# Patient Record
Sex: FEMALE | Race: WHITE | NOT HISPANIC OR LATINO
[De-identification: names, ages, dates, MRNs, and addresses within clinical notes are randomized per-mention and may not be internally consistent; named-entity substitution may affect disease eponyms.]

---

## 2017-02-21 ENCOUNTER — APPOINTMENT (OUTPATIENT)
Dept: PULMONOLOGY | Facility: CLINIC | Age: 82
End: 2017-02-21

## 2017-02-21 ENCOUNTER — APPOINTMENT (OUTPATIENT)
Dept: OPHTHALMOLOGY | Facility: CLINIC | Age: 82
End: 2017-02-21

## 2017-02-21 VITALS
SYSTOLIC BLOOD PRESSURE: 128 MMHG | BODY MASS INDEX: 27.48 KG/M2 | OXYGEN SATURATION: 98 % | HEIGHT: 60 IN | DIASTOLIC BLOOD PRESSURE: 82 MMHG | WEIGHT: 140 LBS | HEART RATE: 77 BPM

## 2017-03-26 ENCOUNTER — FORM ENCOUNTER (OUTPATIENT)
Age: 82
End: 2017-03-26

## 2017-03-27 ENCOUNTER — OUTPATIENT (OUTPATIENT)
Dept: OUTPATIENT SERVICES | Facility: HOSPITAL | Age: 82
LOS: 1 days | End: 2017-03-27
Payer: MEDICARE

## 2017-03-27 ENCOUNTER — APPOINTMENT (OUTPATIENT)
Dept: CT IMAGING | Facility: IMAGING CENTER | Age: 82
End: 2017-03-27

## 2017-03-27 DIAGNOSIS — Z00.8 ENCOUNTER FOR OTHER GENERAL EXAMINATION: ICD-10-CM

## 2017-03-27 DIAGNOSIS — Z90.49 ACQUIRED ABSENCE OF OTHER SPECIFIED PARTS OF DIGESTIVE TRACT: Chronic | ICD-10-CM

## 2017-03-27 PROCEDURE — 71250 CT THORAX DX C-: CPT

## 2017-03-29 ENCOUNTER — APPOINTMENT (OUTPATIENT)
Dept: THORACIC SURGERY | Facility: CLINIC | Age: 82
End: 2017-03-29

## 2017-03-29 VITALS
HEIGHT: 60 IN | SYSTOLIC BLOOD PRESSURE: 134 MMHG | DIASTOLIC BLOOD PRESSURE: 78 MMHG | BODY MASS INDEX: 27.48 KG/M2 | HEART RATE: 76 BPM | WEIGHT: 140 LBS | OXYGEN SATURATION: 97 %

## 2017-03-29 RX ORDER — MULTIVITAMIN
TABLET ORAL
Refills: 0 | Status: ACTIVE | COMMUNITY

## 2017-05-23 ENCOUNTER — APPOINTMENT (OUTPATIENT)
Dept: PULMONOLOGY | Facility: CLINIC | Age: 82
End: 2017-05-23

## 2017-05-23 VITALS
HEIGHT: 60 IN | WEIGHT: 138 LBS | OXYGEN SATURATION: 98 % | SYSTOLIC BLOOD PRESSURE: 166 MMHG | DIASTOLIC BLOOD PRESSURE: 82 MMHG | BODY MASS INDEX: 27.09 KG/M2 | HEART RATE: 75 BPM

## 2017-05-23 RX ORDER — ENALAPRIL MALEATE 5 MG/1
5 TABLET ORAL
Qty: 30 | Refills: 0 | Status: DISCONTINUED | COMMUNITY
Start: 2017-04-03

## 2017-06-06 ENCOUNTER — EMERGENCY (EMERGENCY)
Facility: HOSPITAL | Age: 82
LOS: 1 days | Discharge: HOME | End: 2017-06-06
Attending: EMERGENCY MEDICINE | Admitting: EMERGENCY MEDICINE
Payer: MEDICARE

## 2017-06-06 VITALS
DIASTOLIC BLOOD PRESSURE: 80 MMHG | RESPIRATION RATE: 18 BRPM | HEART RATE: 70 BPM | OXYGEN SATURATION: 97 % | TEMPERATURE: 98 F | SYSTOLIC BLOOD PRESSURE: 177 MMHG

## 2017-06-06 VITALS — SYSTOLIC BLOOD PRESSURE: 166 MMHG | HEART RATE: 70 BPM | DIASTOLIC BLOOD PRESSURE: 80 MMHG

## 2017-06-06 DIAGNOSIS — Z79.899 OTHER LONG TERM (CURRENT) DRUG THERAPY: ICD-10-CM

## 2017-06-06 DIAGNOSIS — I10 ESSENTIAL (PRIMARY) HYPERTENSION: ICD-10-CM

## 2017-06-06 DIAGNOSIS — E03.9 HYPOTHYROIDISM, UNSPECIFIED: ICD-10-CM

## 2017-06-06 DIAGNOSIS — Z87.891 PERSONAL HISTORY OF NICOTINE DEPENDENCE: ICD-10-CM

## 2017-06-06 DIAGNOSIS — Z98.890 OTHER SPECIFIED POSTPROCEDURAL STATES: ICD-10-CM

## 2017-06-06 DIAGNOSIS — E78.00 PURE HYPERCHOLESTEROLEMIA, UNSPECIFIED: ICD-10-CM

## 2017-06-06 DIAGNOSIS — Z90.49 ACQUIRED ABSENCE OF OTHER SPECIFIED PARTS OF DIGESTIVE TRACT: ICD-10-CM

## 2017-06-06 DIAGNOSIS — Z90.49 ACQUIRED ABSENCE OF OTHER SPECIFIED PARTS OF DIGESTIVE TRACT: Chronic | ICD-10-CM

## 2017-06-06 DIAGNOSIS — Z98.49 CATARACT EXTRACTION STATUS, UNSPECIFIED EYE: ICD-10-CM

## 2017-06-06 LAB
ALBUMIN SERPL ELPH-MCNC: 4.2 G/DL — SIGNIFICANT CHANGE UP (ref 3.3–5)
ALP SERPL-CCNC: 87 U/L — SIGNIFICANT CHANGE UP (ref 40–120)
ALT FLD-CCNC: 14 U/L RC — SIGNIFICANT CHANGE UP (ref 10–45)
ANION GAP SERPL CALC-SCNC: 13 MMOL/L — SIGNIFICANT CHANGE UP (ref 5–17)
AST SERPL-CCNC: 22 U/L — SIGNIFICANT CHANGE UP (ref 10–40)
BILIRUB SERPL-MCNC: 0.4 MG/DL — SIGNIFICANT CHANGE UP (ref 0.2–1.2)
BUN SERPL-MCNC: 15 MG/DL — SIGNIFICANT CHANGE UP (ref 7–23)
CALCIUM SERPL-MCNC: 9.6 MG/DL — SIGNIFICANT CHANGE UP (ref 8.4–10.5)
CHLORIDE SERPL-SCNC: 90 MMOL/L — LOW (ref 96–108)
CO2 SERPL-SCNC: 26 MMOL/L — SIGNIFICANT CHANGE UP (ref 22–31)
CREAT SERPL-MCNC: 0.79 MG/DL — SIGNIFICANT CHANGE UP (ref 0.5–1.3)
GLUCOSE SERPL-MCNC: 124 MG/DL — HIGH (ref 70–99)
POTASSIUM SERPL-MCNC: 4.3 MMOL/L — SIGNIFICANT CHANGE UP (ref 3.5–5.3)
POTASSIUM SERPL-SCNC: 4.3 MMOL/L — SIGNIFICANT CHANGE UP (ref 3.5–5.3)
PROT SERPL-MCNC: 7 G/DL — SIGNIFICANT CHANGE UP (ref 6–8.3)
SODIUM SERPL-SCNC: 129 MMOL/L — LOW (ref 135–145)

## 2017-06-06 PROCEDURE — 99284 EMERGENCY DEPT VISIT MOD MDM: CPT | Mod: GC

## 2017-06-06 PROCEDURE — 99283 EMERGENCY DEPT VISIT LOW MDM: CPT

## 2017-06-06 PROCEDURE — 80053 COMPREHEN METABOLIC PANEL: CPT

## 2017-06-06 RX ORDER — LOSARTAN POTASSIUM 100 MG/1
25 TABLET, FILM COATED ORAL DAILY
Qty: 0 | Refills: 0 | Status: DISCONTINUED | OUTPATIENT
Start: 2017-06-06 | End: 2017-06-10

## 2017-06-06 RX ORDER — LOSARTAN POTASSIUM 100 MG/1
1 TABLET, FILM COATED ORAL
Qty: 30 | Refills: 0
Start: 2017-06-06 | End: 2017-07-06

## 2017-06-06 RX ADMIN — LOSARTAN POTASSIUM 25 MILLIGRAM(S): 100 TABLET, FILM COATED ORAL at 15:32

## 2017-06-06 NOTE — ED ADULT NURSE NOTE - OBJECTIVE STATEMENT
c/o elevated blood pressure readings at home for about one week. Denies any headache, visual changes, chest pain, sob, dizziness, palpitations, nausea, vomiting, diarrhea or fevers. Reports feeling anxious and feeling occasional tingling in hands and feet when she is anxious.

## 2017-06-06 NOTE — ED ADULT NURSE NOTE - PMH
Adult Hypothyroidism    Cholelithiasis    HTN (Hypertension)    Hypercholesteremia    IBS (irritable bowel syndrome)

## 2017-06-06 NOTE — ED PROVIDER NOTE - ATTENDING CONTRIBUTION TO CARE
I, Dr. Keron Garcia, interviewed the patient and performed a physical exam and spoke to the resident about the plan of care for this patient.  Pt with asxs HTN.  no cp, sob.  Appears well.

## 2017-06-06 NOTE — ED PROVIDER NOTE - NS ED ROS FT
No fever/chills, no change in vision, no throat pain, no chest pain, no abdominal pain, no nausea/vomiting,  no dysuria, no joint pain, no rashes, no focal numbness or weakness, no known mental health issues

## 2017-06-06 NOTE — ED PROVIDER NOTE - CARE PLAN
Principal Discharge DX:	HTN (hypertension)  Instructions for follow-up, activity and diet:	Take all your previous medications as prescribed. Take Losartan 25mg by mouth, once a day. Follow up with Dr. Fry as planned on Friday. Any new or worsening symptoms, chest pain, shortness of breath, change in vision, headache, numbness or weakness or any other concern please return immediately to the emergency room.

## 2017-06-06 NOTE — ED PROVIDER NOTE - PROGRESS NOTE DETAILS
Discussed Dr. Neri - cough with enalapril; dizziness with norvasc; losartan 25mg fu on Friday Discussed Dr. Neri - cough with enalapril; dizziness with norvasc; losartan 25mg fu on Friday. Pt already has appointment

## 2017-06-06 NOTE — ED PROVIDER NOTE - PLAN OF CARE
Take all your previous medications as prescribed. Take Losartan 25mg by mouth, once a day. Follow up with Dr. Fry as planned on Friday. Any new or worsening symptoms, chest pain, shortness of breath, change in vision, headache, numbness or weakness or any other concern please return immediately to the emergency room.

## 2017-06-06 NOTE — ED PROVIDER NOTE - MEDICAL DECISION MAKING DETAILS
Essential HTN, assymptomatic. Essential HTN, asymptomatic - 170's/100's. discussed with primary and pt to start losartan 25mg and fu with pmd on friday. Pt has appt currently.

## 2017-06-06 NOTE — ED PROVIDER NOTE - OBJECTIVE STATEMENT
89F pmhx of HTN, HLD, told to come to ED for BP management bc SYS at home was 190.     CARD: [place] [facility] Dr. Burciaga   PMD: Teto Fry. -89F pmhx of HTN, HLD, told to come to ED for BP management b/c SYS at home was 190.     CARD: [place] [facility] Dr. Burciaga   PMD: Teto Fry. 89F pmhx of HTN, HLD, told to come to ED for BP management b/c SYS at home was 190 x 1; pt has journal which demonstrates multiple blood pressures over the last three days with ranges from 140-170's. Pt is assymptomatic      CARD: [facility] Dr. Burciaga   PMD: Teto Fry. 89F pmhx of HTN, HLD, told to come to ED for BP management b/c SYS at home was 190 x 1; pt has journal which demonstrates multiple blood pressures over the last three days with ranges from 140-170's. Pt is asymptomatic, no ha, no cp, no sob, no decreased uop, no leg swelling.       CARD: [facility] Dr. Burciaga   PMD: Teto Fry.

## 2017-06-18 ENCOUNTER — EMERGENCY (EMERGENCY)
Facility: HOSPITAL | Age: 82
LOS: 1 days | Discharge: HOME | End: 2017-06-18
Attending: EMERGENCY MEDICINE | Admitting: EMERGENCY MEDICINE
Payer: MEDICARE

## 2017-06-18 VITALS
RESPIRATION RATE: 16 BRPM | SYSTOLIC BLOOD PRESSURE: 146 MMHG | DIASTOLIC BLOOD PRESSURE: 80 MMHG | HEART RATE: 69 BPM | OXYGEN SATURATION: 98 %

## 2017-06-18 VITALS
DIASTOLIC BLOOD PRESSURE: 96 MMHG | HEART RATE: 73 BPM | TEMPERATURE: 98 F | SYSTOLIC BLOOD PRESSURE: 192 MMHG | RESPIRATION RATE: 18 BRPM | OXYGEN SATURATION: 97 %

## 2017-06-18 DIAGNOSIS — Z90.49 ACQUIRED ABSENCE OF OTHER SPECIFIED PARTS OF DIGESTIVE TRACT: Chronic | ICD-10-CM

## 2017-06-18 LAB
ALBUMIN SERPL ELPH-MCNC: 4.1 G/DL — SIGNIFICANT CHANGE UP (ref 3.3–5)
ALP SERPL-CCNC: 92 U/L — SIGNIFICANT CHANGE UP (ref 40–120)
ALT FLD-CCNC: 16 U/L RC — SIGNIFICANT CHANGE UP (ref 10–45)
ANION GAP SERPL CALC-SCNC: 13 MMOL/L — SIGNIFICANT CHANGE UP (ref 5–17)
APPEARANCE UR: CLEAR — SIGNIFICANT CHANGE UP
AST SERPL-CCNC: 21 U/L — SIGNIFICANT CHANGE UP (ref 10–40)
BACTERIA # UR AUTO: ABNORMAL /HPF
BASOPHILS # BLD AUTO: 0 K/UL — SIGNIFICANT CHANGE UP (ref 0–0.2)
BASOPHILS NFR BLD AUTO: 0.5 % — SIGNIFICANT CHANGE UP (ref 0–2)
BILIRUB SERPL-MCNC: 0.3 MG/DL — SIGNIFICANT CHANGE UP (ref 0.2–1.2)
BILIRUB UR-MCNC: NEGATIVE — SIGNIFICANT CHANGE UP
BUN SERPL-MCNC: 26 MG/DL — HIGH (ref 7–23)
CALCIUM SERPL-MCNC: 9.2 MG/DL — SIGNIFICANT CHANGE UP (ref 8.4–10.5)
CHLORIDE SERPL-SCNC: 94 MMOL/L — LOW (ref 96–108)
CO2 SERPL-SCNC: 27 MMOL/L — SIGNIFICANT CHANGE UP (ref 22–31)
COLOR SPEC: COLORLESS — SIGNIFICANT CHANGE UP
CREAT SERPL-MCNC: 0.84 MG/DL — SIGNIFICANT CHANGE UP (ref 0.5–1.3)
DIFF PNL FLD: NEGATIVE — SIGNIFICANT CHANGE UP
EOSINOPHIL # BLD AUTO: 0.1 K/UL — SIGNIFICANT CHANGE UP (ref 0–0.5)
EOSINOPHIL NFR BLD AUTO: 2 % — SIGNIFICANT CHANGE UP (ref 0–6)
EPI CELLS # UR: SIGNIFICANT CHANGE UP /HPF
GLUCOSE SERPL-MCNC: 132 MG/DL — HIGH (ref 70–99)
GLUCOSE UR QL: NEGATIVE — SIGNIFICANT CHANGE UP
HCT VFR BLD CALC: 39.5 % — SIGNIFICANT CHANGE UP (ref 34.5–45)
HGB BLD-MCNC: 13.2 G/DL — SIGNIFICANT CHANGE UP (ref 11.5–15.5)
KETONES UR-MCNC: NEGATIVE — SIGNIFICANT CHANGE UP
LEUKOCYTE ESTERASE UR-ACNC: NEGATIVE — SIGNIFICANT CHANGE UP
LYMPHOCYTES # BLD AUTO: 1.8 K/UL — SIGNIFICANT CHANGE UP (ref 1–3.3)
LYMPHOCYTES # BLD AUTO: 25 % — SIGNIFICANT CHANGE UP (ref 13–44)
MCHC RBC-ENTMCNC: 32.5 PG — SIGNIFICANT CHANGE UP (ref 27–34)
MCHC RBC-ENTMCNC: 33.5 GM/DL — SIGNIFICANT CHANGE UP (ref 32–36)
MCV RBC AUTO: 96.9 FL — SIGNIFICANT CHANGE UP (ref 80–100)
MONOCYTES # BLD AUTO: 0.7 K/UL — SIGNIFICANT CHANGE UP (ref 0–0.9)
MONOCYTES NFR BLD AUTO: 10.3 % — SIGNIFICANT CHANGE UP (ref 2–14)
NEUTROPHILS # BLD AUTO: 4.4 K/UL — SIGNIFICANT CHANGE UP (ref 1.8–7.4)
NEUTROPHILS NFR BLD AUTO: 62.3 % — SIGNIFICANT CHANGE UP (ref 43–77)
NITRITE UR-MCNC: NEGATIVE — SIGNIFICANT CHANGE UP
PH UR: 7 — SIGNIFICANT CHANGE UP (ref 5–8)
PLATELET # BLD AUTO: 148 K/UL — LOW (ref 150–400)
POTASSIUM SERPL-MCNC: 4.6 MMOL/L — SIGNIFICANT CHANGE UP (ref 3.5–5.3)
POTASSIUM SERPL-SCNC: 4.6 MMOL/L — SIGNIFICANT CHANGE UP (ref 3.5–5.3)
PROT SERPL-MCNC: 7 G/DL — SIGNIFICANT CHANGE UP (ref 6–8.3)
PROT UR-MCNC: NEGATIVE — SIGNIFICANT CHANGE UP
RBC # BLD: 4.07 M/UL — SIGNIFICANT CHANGE UP (ref 3.8–5.2)
RBC # FLD: 11.8 % — SIGNIFICANT CHANGE UP (ref 10.3–14.5)
SODIUM SERPL-SCNC: 134 MMOL/L — LOW (ref 135–145)
SP GR SPEC: 1.01 — LOW (ref 1.01–1.02)
UROBILINOGEN FLD QL: NEGATIVE — SIGNIFICANT CHANGE UP
WBC # BLD: 7.1 K/UL — SIGNIFICANT CHANGE UP (ref 3.8–10.5)
WBC # FLD AUTO: 7.1 K/UL — SIGNIFICANT CHANGE UP (ref 3.8–10.5)
WBC UR QL: SIGNIFICANT CHANGE UP /HPF (ref 0–5)

## 2017-06-18 PROCEDURE — 70450 CT HEAD/BRAIN W/O DYE: CPT

## 2017-06-18 PROCEDURE — 80053 COMPREHEN METABOLIC PANEL: CPT

## 2017-06-18 PROCEDURE — 85027 COMPLETE CBC AUTOMATED: CPT

## 2017-06-18 PROCEDURE — 70450 CT HEAD/BRAIN W/O DYE: CPT | Mod: 26

## 2017-06-18 PROCEDURE — 93010 ELECTROCARDIOGRAM REPORT: CPT

## 2017-06-18 PROCEDURE — 93005 ELECTROCARDIOGRAM TRACING: CPT

## 2017-06-18 PROCEDURE — 81001 URINALYSIS AUTO W/SCOPE: CPT

## 2017-06-18 PROCEDURE — 99284 EMERGENCY DEPT VISIT MOD MDM: CPT | Mod: 25,GC

## 2017-06-18 PROCEDURE — 99284 EMERGENCY DEPT VISIT MOD MDM: CPT | Mod: 25

## 2017-06-18 NOTE — ED ADULT NURSE NOTE - OBJECTIVE STATEMENT
Pt is an 89YOF received ambulatory A&Ox4 complaining of high blood pressure. Pt states that she woke up from sleep feeling "weird" Pt states that she then took her BP and noticed that it was elevated, 196/80. Pt states that she took all of her medications as prescribed today, no missed doses. Pt denies any headache, dizziness, chest pain, SOB, nausea, vomiting, fever, chills, or abdominal pain. Pt is unable to describe her "weird" feeling, no visual changes. Pts BP upon arrival to room, 156/87.

## 2017-06-18 NOTE — ED PROVIDER NOTE - OBJECTIVE STATEMENT
Woke up from sleep feeling "off", took /80. No missed doses of medications. Currently BP improved. Reports no CP, SOB, palpitations. No H/A, vomiting, vision changes, issues w/ urination. Currently feels improved.     CARD: [facility] Dr. Burciaga   PMD: Teto Fry.

## 2017-06-18 NOTE — ED PROVIDER NOTE - PHYSICAL EXAMINATION
Physical Exam: elderly F who is in NAD, AAOx3, NCAT, MMM, neck is supple, PERRL, CTAB, normal rate and regular rhythm, abdomen is soft and NTND, No edema, No deformity of extremities, No rashes, CN grossly intact, No focal motor or sensory deficits. ~ Olman Farmer MD

## 2017-06-18 NOTE — ED PROVIDER NOTE - NS ED ROS FT
No fever, no chills, no change in vision, no change in hearing, no chest pain, no shortness of breath, no abdominal pain, no vomiting, no dysuria, no muscle pain, no rashes, no loss of consciousness. ~ Olman Farmer MD

## 2017-06-18 NOTE — ED PROVIDER NOTE - ATTENDING CONTRIBUTION TO CARE
patient presenting with asymptomatic htn at home. normal tensive in ED. no complaints. plan for education and discharge.

## 2017-09-11 ENCOUNTER — APPOINTMENT (OUTPATIENT)
Dept: OPHTHALMOLOGY | Facility: CLINIC | Age: 82
End: 2017-09-11
Payer: MEDICARE

## 2017-09-11 PROCEDURE — 92014 COMPRE OPH EXAM EST PT 1/>: CPT

## 2017-09-12 ENCOUNTER — OUTPATIENT (OUTPATIENT)
Dept: OUTPATIENT SERVICES | Facility: HOSPITAL | Age: 82
LOS: 1 days | End: 2017-09-12
Payer: MEDICARE

## 2017-09-12 ENCOUNTER — APPOINTMENT (OUTPATIENT)
Dept: MAMMOGRAPHY | Facility: IMAGING CENTER | Age: 82
End: 2017-09-12
Payer: MEDICARE

## 2017-09-12 DIAGNOSIS — Z00.00 ENCOUNTER FOR GENERAL ADULT MEDICAL EXAMINATION WITHOUT ABNORMAL FINDINGS: ICD-10-CM

## 2017-09-12 DIAGNOSIS — Z90.49 ACQUIRED ABSENCE OF OTHER SPECIFIED PARTS OF DIGESTIVE TRACT: Chronic | ICD-10-CM

## 2017-09-12 PROCEDURE — 77063 BREAST TOMOSYNTHESIS BI: CPT | Mod: 26

## 2017-09-12 PROCEDURE — 77067 SCR MAMMO BI INCL CAD: CPT

## 2017-09-12 PROCEDURE — G0202: CPT | Mod: 26

## 2017-09-12 PROCEDURE — 77063 BREAST TOMOSYNTHESIS BI: CPT

## 2018-03-05 ENCOUNTER — APPOINTMENT (OUTPATIENT)
Dept: OPHTHALMOLOGY | Facility: CLINIC | Age: 83
End: 2018-03-05
Payer: MEDICARE

## 2018-03-05 PROCEDURE — 92014 COMPRE OPH EXAM EST PT 1/>: CPT

## 2018-03-30 ENCOUNTER — APPOINTMENT (OUTPATIENT)
Dept: PULMONOLOGY | Facility: CLINIC | Age: 83
End: 2018-03-30
Payer: MEDICARE

## 2018-03-30 VITALS
TEMPERATURE: 98.1 F | OXYGEN SATURATION: 97 % | SYSTOLIC BLOOD PRESSURE: 150 MMHG | BODY MASS INDEX: 25.19 KG/M2 | WEIGHT: 129 LBS | DIASTOLIC BLOOD PRESSURE: 78 MMHG | HEART RATE: 73 BPM

## 2018-03-30 PROCEDURE — 99214 OFFICE O/P EST MOD 30 MIN: CPT

## 2018-09-10 ENCOUNTER — APPOINTMENT (OUTPATIENT)
Dept: OPHTHALMOLOGY | Facility: CLINIC | Age: 83
End: 2018-09-10

## 2018-09-12 ENCOUNTER — APPOINTMENT (OUTPATIENT)
Dept: OPHTHALMOLOGY | Facility: CLINIC | Age: 83
End: 2018-09-12
Payer: MEDICARE

## 2018-09-12 PROCEDURE — 92014 COMPRE OPH EXAM EST PT 1/>: CPT

## 2018-09-14 ENCOUNTER — OUTPATIENT (OUTPATIENT)
Dept: OUTPATIENT SERVICES | Facility: HOSPITAL | Age: 83
LOS: 1 days | End: 2018-09-14
Payer: MEDICARE

## 2018-09-14 ENCOUNTER — APPOINTMENT (OUTPATIENT)
Dept: MAMMOGRAPHY | Facility: IMAGING CENTER | Age: 83
End: 2018-09-14
Payer: MEDICARE

## 2018-09-14 DIAGNOSIS — Z00.8 ENCOUNTER FOR OTHER GENERAL EXAMINATION: ICD-10-CM

## 2018-09-14 DIAGNOSIS — Z90.49 ACQUIRED ABSENCE OF OTHER SPECIFIED PARTS OF DIGESTIVE TRACT: Chronic | ICD-10-CM

## 2018-09-14 PROCEDURE — 77067 SCR MAMMO BI INCL CAD: CPT | Mod: 26

## 2018-09-14 PROCEDURE — 77063 BREAST TOMOSYNTHESIS BI: CPT

## 2018-09-14 PROCEDURE — 77063 BREAST TOMOSYNTHESIS BI: CPT | Mod: 26

## 2018-09-14 PROCEDURE — 77067 SCR MAMMO BI INCL CAD: CPT

## 2018-09-19 ENCOUNTER — FORM ENCOUNTER (OUTPATIENT)
Age: 83
End: 2018-09-19

## 2018-09-20 ENCOUNTER — OUTPATIENT (OUTPATIENT)
Dept: OUTPATIENT SERVICES | Facility: HOSPITAL | Age: 83
LOS: 1 days | End: 2018-09-20
Payer: MEDICARE

## 2018-09-20 ENCOUNTER — APPOINTMENT (OUTPATIENT)
Dept: CT IMAGING | Facility: IMAGING CENTER | Age: 83
End: 2018-09-20
Payer: MEDICARE

## 2018-09-20 DIAGNOSIS — R91.8 OTHER NONSPECIFIC ABNORMAL FINDING OF LUNG FIELD: ICD-10-CM

## 2018-09-20 DIAGNOSIS — Z90.49 ACQUIRED ABSENCE OF OTHER SPECIFIED PARTS OF DIGESTIVE TRACT: Chronic | ICD-10-CM

## 2018-09-20 DIAGNOSIS — R05 COUGH: ICD-10-CM

## 2018-09-20 PROCEDURE — 71250 CT THORAX DX C-: CPT

## 2018-09-20 PROCEDURE — 71250 CT THORAX DX C-: CPT | Mod: 26

## 2018-09-26 ENCOUNTER — APPOINTMENT (OUTPATIENT)
Dept: THORACIC SURGERY | Facility: CLINIC | Age: 83
End: 2018-09-26
Payer: MEDICARE

## 2018-09-26 VITALS
DIASTOLIC BLOOD PRESSURE: 98 MMHG | BODY MASS INDEX: 25.32 KG/M2 | WEIGHT: 129 LBS | RESPIRATION RATE: 16 BRPM | TEMPERATURE: 98.1 F | OXYGEN SATURATION: 94 % | SYSTOLIC BLOOD PRESSURE: 182 MMHG | HEIGHT: 60 IN | HEART RATE: 76 BPM

## 2018-09-26 DIAGNOSIS — Z86.39 PERSONAL HISTORY OF OTHER ENDOCRINE, NUTRITIONAL AND METABOLIC DISEASE: ICD-10-CM

## 2018-09-26 PROCEDURE — 99213 OFFICE O/P EST LOW 20 MIN: CPT

## 2018-09-26 RX ORDER — ATORVASTATIN CALCIUM 20 MG/1
20 TABLET, FILM COATED ORAL
Qty: 90 | Refills: 0 | Status: DISCONTINUED | COMMUNITY
Start: 2016-05-04 | End: 2018-09-26

## 2018-09-26 RX ORDER — NAPROXEN SODIUM 220 MG
TABLET ORAL
Refills: 0 | Status: ACTIVE | COMMUNITY

## 2018-09-26 RX ORDER — METHYLPREDNISOLONE 4 MG/1
4 TABLET ORAL
Qty: 1 | Refills: 0 | Status: DISCONTINUED | COMMUNITY
Start: 2018-04-06 | End: 2018-09-26

## 2018-09-26 RX ORDER — AMLODIPINE BESYLATE 2.5 MG/1
2.5 TABLET ORAL
Qty: 30 | Refills: 0 | Status: DISCONTINUED | COMMUNITY
Start: 2017-05-22 | End: 2018-09-26

## 2018-09-26 RX ORDER — ALBUTEROL SULFATE 90 UG/1
AEROSOL, METERED RESPIRATORY (INHALATION)
Refills: 0 | Status: DISCONTINUED | COMMUNITY
End: 2018-09-26

## 2018-09-28 ENCOUNTER — APPOINTMENT (OUTPATIENT)
Dept: MAMMOGRAPHY | Facility: IMAGING CENTER | Age: 83
End: 2018-09-28
Payer: MEDICARE

## 2018-09-28 ENCOUNTER — OUTPATIENT (OUTPATIENT)
Dept: OUTPATIENT SERVICES | Facility: HOSPITAL | Age: 83
LOS: 1 days | End: 2018-09-28
Payer: MEDICARE

## 2018-09-28 ENCOUNTER — APPOINTMENT (OUTPATIENT)
Dept: ULTRASOUND IMAGING | Facility: IMAGING CENTER | Age: 83
End: 2018-09-28
Payer: MEDICARE

## 2018-09-28 DIAGNOSIS — Z90.49 ACQUIRED ABSENCE OF OTHER SPECIFIED PARTS OF DIGESTIVE TRACT: Chronic | ICD-10-CM

## 2018-09-28 DIAGNOSIS — Z00.8 ENCOUNTER FOR OTHER GENERAL EXAMINATION: ICD-10-CM

## 2018-09-28 PROCEDURE — 76642 ULTRASOUND BREAST LIMITED: CPT | Mod: 26,RT

## 2018-09-28 PROCEDURE — 77065 DX MAMMO INCL CAD UNI: CPT

## 2018-09-28 PROCEDURE — 77065 DX MAMMO INCL CAD UNI: CPT | Mod: 26,RT

## 2018-09-28 PROCEDURE — G0279: CPT

## 2018-09-28 PROCEDURE — G0279: CPT | Mod: 26

## 2018-09-28 PROCEDURE — 76642 ULTRASOUND BREAST LIMITED: CPT

## 2018-10-30 ENCOUNTER — APPOINTMENT (OUTPATIENT)
Dept: PULMONOLOGY | Facility: CLINIC | Age: 83
End: 2018-10-30
Payer: MEDICARE

## 2018-10-30 VITALS
WEIGHT: 131 LBS | TEMPERATURE: 98 F | BODY MASS INDEX: 25.58 KG/M2 | OXYGEN SATURATION: 100 % | HEART RATE: 82 BPM | SYSTOLIC BLOOD PRESSURE: 124 MMHG | DIASTOLIC BLOOD PRESSURE: 64 MMHG

## 2018-10-30 PROCEDURE — 99214 OFFICE O/P EST MOD 30 MIN: CPT

## 2018-10-30 RX ORDER — ALBUTEROL SULFATE 90 UG/1
108 (90 BASE) AEROSOL, METERED RESPIRATORY (INHALATION)
Qty: 1 | Refills: 1 | Status: ACTIVE | COMMUNITY
Start: 2018-10-30 | End: 1900-01-01

## 2019-04-01 ENCOUNTER — APPOINTMENT (OUTPATIENT)
Dept: OPHTHALMOLOGY | Facility: CLINIC | Age: 84
End: 2019-04-01
Payer: MEDICARE

## 2019-04-01 DIAGNOSIS — H04.123 DRY EYE SYNDROME OF BILATERAL LACRIMAL GLANDS: ICD-10-CM

## 2019-04-01 DIAGNOSIS — H52.31 ANISOMETROPIA: ICD-10-CM

## 2019-04-01 DIAGNOSIS — Z96.1 PRESENCE OF INTRAOCULAR LENS: ICD-10-CM

## 2019-04-01 PROCEDURE — 92014 COMPRE OPH EXAM EST PT 1/>: CPT

## 2019-10-02 ENCOUNTER — APPOINTMENT (OUTPATIENT)
Dept: OPHTHALMOLOGY | Facility: CLINIC | Age: 84
End: 2019-10-02

## 2019-10-15 ENCOUNTER — OUTPATIENT (OUTPATIENT)
Dept: OUTPATIENT SERVICES | Facility: HOSPITAL | Age: 84
LOS: 1 days | End: 2019-10-15
Payer: MEDICARE

## 2019-10-15 ENCOUNTER — APPOINTMENT (OUTPATIENT)
Dept: MAMMOGRAPHY | Facility: IMAGING CENTER | Age: 84
End: 2019-10-15
Payer: MEDICARE

## 2019-10-15 ENCOUNTER — APPOINTMENT (OUTPATIENT)
Dept: ULTRASOUND IMAGING | Facility: IMAGING CENTER | Age: 84
End: 2019-10-15
Payer: MEDICARE

## 2019-10-15 ENCOUNTER — FORM ENCOUNTER (OUTPATIENT)
Age: 84
End: 2019-10-15

## 2019-10-15 DIAGNOSIS — Z00.8 ENCOUNTER FOR OTHER GENERAL EXAMINATION: ICD-10-CM

## 2019-10-15 DIAGNOSIS — Z90.49 ACQUIRED ABSENCE OF OTHER SPECIFIED PARTS OF DIGESTIVE TRACT: Chronic | ICD-10-CM

## 2019-10-15 PROCEDURE — 76641 ULTRASOUND BREAST COMPLETE: CPT | Mod: 26,50

## 2019-10-15 PROCEDURE — 76641 ULTRASOUND BREAST COMPLETE: CPT

## 2019-10-15 PROCEDURE — 77067 SCR MAMMO BI INCL CAD: CPT | Mod: 26

## 2019-10-15 PROCEDURE — 77063 BREAST TOMOSYNTHESIS BI: CPT | Mod: 26

## 2019-10-15 PROCEDURE — 77067 SCR MAMMO BI INCL CAD: CPT

## 2019-10-15 PROCEDURE — 77063 BREAST TOMOSYNTHESIS BI: CPT

## 2019-10-16 ENCOUNTER — APPOINTMENT (OUTPATIENT)
Dept: CT IMAGING | Facility: IMAGING CENTER | Age: 84
End: 2019-10-16
Payer: MEDICARE

## 2019-10-16 ENCOUNTER — OUTPATIENT (OUTPATIENT)
Dept: OUTPATIENT SERVICES | Facility: HOSPITAL | Age: 84
LOS: 1 days | End: 2019-10-16
Payer: MEDICARE

## 2019-10-16 ENCOUNTER — APPOINTMENT (OUTPATIENT)
Dept: MAMMOGRAPHY | Facility: IMAGING CENTER | Age: 84
End: 2019-10-16
Payer: MEDICARE

## 2019-10-16 DIAGNOSIS — Z90.49 ACQUIRED ABSENCE OF OTHER SPECIFIED PARTS OF DIGESTIVE TRACT: Chronic | ICD-10-CM

## 2019-10-16 DIAGNOSIS — R91.8 OTHER NONSPECIFIC ABNORMAL FINDING OF LUNG FIELD: ICD-10-CM

## 2019-10-16 PROCEDURE — G0279: CPT | Mod: 26

## 2019-10-16 PROCEDURE — 77065 DX MAMMO INCL CAD UNI: CPT | Mod: 26,LT

## 2019-10-16 PROCEDURE — G0279: CPT

## 2019-10-16 PROCEDURE — 71250 CT THORAX DX C-: CPT | Mod: 26

## 2019-10-16 PROCEDURE — 71250 CT THORAX DX C-: CPT

## 2019-10-16 PROCEDURE — 77065 DX MAMMO INCL CAD UNI: CPT

## 2019-10-23 ENCOUNTER — RESULT REVIEW (OUTPATIENT)
Age: 84
End: 2019-10-23

## 2019-10-23 ENCOUNTER — OUTPATIENT (OUTPATIENT)
Dept: OUTPATIENT SERVICES | Facility: HOSPITAL | Age: 84
LOS: 1 days | End: 2019-10-23
Payer: MEDICARE

## 2019-10-23 ENCOUNTER — APPOINTMENT (OUTPATIENT)
Dept: MAMMOGRAPHY | Facility: IMAGING CENTER | Age: 84
End: 2019-10-23
Payer: MEDICARE

## 2019-10-23 DIAGNOSIS — Z00.8 ENCOUNTER FOR OTHER GENERAL EXAMINATION: ICD-10-CM

## 2019-10-23 DIAGNOSIS — Z90.49 ACQUIRED ABSENCE OF OTHER SPECIFIED PARTS OF DIGESTIVE TRACT: Chronic | ICD-10-CM

## 2019-10-23 PROCEDURE — 88305 TISSUE EXAM BY PATHOLOGIST: CPT | Mod: 26

## 2019-10-23 PROCEDURE — 19081 BX BREAST 1ST LESION STRTCTC: CPT

## 2019-10-23 PROCEDURE — A4648: CPT

## 2019-10-23 PROCEDURE — 19081 BX BREAST 1ST LESION STRTCTC: CPT | Mod: LT

## 2019-10-23 PROCEDURE — 77065 DX MAMMO INCL CAD UNI: CPT

## 2019-10-23 PROCEDURE — 88305 TISSUE EXAM BY PATHOLOGIST: CPT

## 2019-10-23 PROCEDURE — 77065 DX MAMMO INCL CAD UNI: CPT | Mod: 26,LT

## 2019-10-30 ENCOUNTER — APPOINTMENT (OUTPATIENT)
Dept: THORACIC SURGERY | Facility: CLINIC | Age: 84
End: 2019-10-30
Payer: MEDICARE

## 2019-10-30 VITALS
BODY MASS INDEX: 25.78 KG/M2 | TEMPERATURE: 97 F | OXYGEN SATURATION: 97 % | RESPIRATION RATE: 16 BRPM | WEIGHT: 132 LBS | HEART RATE: 72 BPM | DIASTOLIC BLOOD PRESSURE: 74 MMHG | SYSTOLIC BLOOD PRESSURE: 137 MMHG

## 2019-10-30 PROCEDURE — 99215 OFFICE O/P EST HI 40 MIN: CPT

## 2019-10-31 ENCOUNTER — APPOINTMENT (OUTPATIENT)
Dept: PULMONOLOGY | Facility: CLINIC | Age: 84
End: 2019-10-31
Payer: MEDICARE

## 2019-10-31 VITALS
HEIGHT: 60 IN | RESPIRATION RATE: 16 BRPM | BODY MASS INDEX: 25.91 KG/M2 | OXYGEN SATURATION: 96 % | WEIGHT: 132 LBS | HEART RATE: 80 BPM | DIASTOLIC BLOOD PRESSURE: 70 MMHG | SYSTOLIC BLOOD PRESSURE: 138 MMHG

## 2019-10-31 DIAGNOSIS — Z01.811 ENCOUNTER FOR PREPROCEDURAL RESPIRATORY EXAMINATION: ICD-10-CM

## 2019-10-31 PROCEDURE — 94727 GAS DIL/WSHOT DETER LNG VOL: CPT

## 2019-10-31 PROCEDURE — 94729 DIFFUSING CAPACITY: CPT

## 2019-10-31 PROCEDURE — 94060 EVALUATION OF WHEEZING: CPT

## 2019-10-31 PROCEDURE — 99214 OFFICE O/P EST MOD 30 MIN: CPT | Mod: 25

## 2019-10-31 NOTE — PHYSICAL EXAM
[General Appearance - Well Developed] : well developed [Normal Appearance] : normal appearance [Well Groomed] : well groomed [General Appearance - Well Nourished] : well nourished [No Deformities] : no deformities [General Appearance - In No Acute Distress] : no acute distress [Normal Conjunctiva] : the conjunctiva exhibited no abnormalities [Eyelids - No Xanthelasma] : the eyelids demonstrated no xanthelasmas [Normal Oropharynx] : normal oropharynx [Neck Appearance] : the appearance of the neck was normal [Neck Cervical Mass (___cm)] : no neck mass was observed [Jugular Venous Distention Increased] : there was no jugular-venous distention [Thyroid Diffuse Enlargement] : the thyroid was not enlarged [Thyroid Nodule] : there were no palpable thyroid nodules [Heart Rate And Rhythm] : heart rate and rhythm were normal [Heart Sounds] : normal S1 and S2 [Murmurs] : no murmurs present [Edema] : no peripheral edema present [Veins - Varicosity Changes] : no varicosital changes were noted in the lower extremities [Respiration, Rhythm And Depth] : normal respiratory rhythm and effort [Exaggerated Use Of Accessory Muscles For Inspiration] : no accessory muscle use [Auscultation Breath Sounds / Voice Sounds] : lungs were clear to auscultation bilaterally [Abdomen Soft] : soft [Abdomen Tenderness] : non-tender [Abdomen Mass (___ Cm)] : no abdominal mass palpated [Nail Clubbing] : no clubbing of the fingernails [Cyanosis, Localized] : no localized cyanosis [Petechial Hemorrhages (___cm)] : no petechial hemorrhages [Skin Color & Pigmentation] : normal skin color and pigmentation [] : no rash [No Venous Stasis] : no venous stasis [Skin Lesions] : no skin lesions [No Skin Ulcers] : no skin ulcer [No Xanthoma] : no  xanthoma was observed [No Focal Deficits] : no focal deficits [Oriented To Time, Place, And Person] : oriented to person, place, and time [Impaired Insight] : insight and judgment were intact [Affect] : the affect was normal

## 2019-10-31 NOTE — CONSULT LETTER
[Dear  ___] : Dear  [unfilled], [Courtesy Letter:] : I had the pleasure of seeing your patient, [unfilled], in my office today. [Please see my note below.] : Please see my note below. [Sincerely,] : Sincerely, [FreeTextEntry2] : Dr. Terry Cifuentes (Pul)\par Dr. Teto Fry (PCP)\par Dr. Palma (Cardiologist)  [FreeTextEntry3] : Clive Leger MD, FACS \par , Division of Thoracic Surgery \par [facility] \par Chief, Thoracic Surgery \par [facility] \par Department of Cardiovascular & Thoracic Surgery \par  \par [facility] \par \par

## 2019-10-31 NOTE — HISTORY OF PRESENT ILLNESS
[FreeTextEntry1] : 89 years old female, former smoker, with hx of HTN, stroke and chronic lung nodules, followed by both Pul Dr. Cifuentes and our office. Patient has been followed with serial scans for several ground glass opacities. CT Chest on 9/20/18 revealed a 2.3 cm right lower lobe nodule previously right upper lobe groundglass nodules measure up to 1 cm and are unchanged. With a right upper lobe ill-defined 8 mm nodule is unchanged from several prior studies. \par \par CT Chest on 10/16/19:\par - interval increase in size of a subsolid opacity in the superior segment of the RLL with groundglass portion 2.7 cm (previously 2.1 cm) and and new 0.2 cm solid portion. \par - stable groundglass opacities in RUL\par - stable postinflammatory scarring and mild bronchiectasis in RML\par \par Pt presents today for follow up. Pt admits to cough, denies SOB or CP. \par

## 2019-10-31 NOTE — ASSESSMENT
[FreeTextEntry1] : 89 years old female, former smoker, with hx of HTN, stroke and chronic lung nodules, followed by both Pul Dr. Cifuentes and our office. Patient has been followed with serial scans for several ground glass opacities. \par \par CT reviewed with pt and I also spoke with pt's son Dr. Lauren on the phone. RLL nodule has grown larger and with solid component, which is highly suspicious for lung cancer. I recommended Rt VATS, wedge rxn of RLL. Risks and benefits and alternatives explained to patient, all questions answered, patient agreed to proceed with surgery. Prior to surgery, pt will need medical and cardiac clearance, and new set of PFTs. \par \par \par Written by Estrella Boo NP, acting as a scribe for Dr. Claudine Bull. \par \par The documentation recorded by the scribe accurately reflects the service I personally performed and the decisions made by me. CLAUDINE BULL MD\par

## 2019-10-31 NOTE — REASON FOR VISIT
[Abnormal CT Scan] : abnormal CT Scan [Preoperative Evaluation] : an preoperative evaluation [FreeTextEntry2] : pulmonary nodule

## 2019-10-31 NOTE — DATA REVIEWED
[FreeTextEntry1] : CT Chest on 10/16/19:\par - interval increase in size of a subsolid opacity in the superior segment of the RLL with groundglass portion 2.7 cm (previously 2.1 cm) and and new 0.2 cm solid portion. \par - stable groundglass opacities in RUL\par - stable postinflammatory scarring and mild bronchiectasis in RML\par

## 2019-10-31 NOTE — REVIEW OF SYSTEMS
[Hypertension] : ~T hypertension [Negative] : Endocrine [Cough] : no cough [Sputum] : not coughing up ~M sputum [Hemoptysis] : no hemoptysis [Dyspnea] : no dyspnea [Wheezing] : no wheezing

## 2019-10-31 NOTE — DISCUSSION/SUMMARY
[FreeTextEntry1] : 89 yo female with increase in size of pulmonary nodule, scheduled for surgical resection. Presently no pulmonary contraindication for planned surgery and anesthesia. She is to use albuterol MDI PRN.

## 2019-11-03 NOTE — HISTORY OF PRESENT ILLNESS
[FreeTextEntry1] : 89 yo female with abnormal chest CT findings, presents for preop pulmonary evaluation. Follow chest CT revealed increase in nodule size. The patient denies cough, chest pain, weight loss, night sweats or hemoptysis.

## 2019-11-03 NOTE — HISTORY OF PRESENT ILLNESS
[FreeTextEntry1] : 89 yo female with abnormal chest CT findings, presents for preop pulmonary evaluation. Follow chest CT revealed increase in nodule size. The patient denies cough, chest pain, weight loss, night sweats or hemoptysis.

## 2019-11-04 ENCOUNTER — OUTPATIENT (OUTPATIENT)
Dept: OUTPATIENT SERVICES | Facility: HOSPITAL | Age: 84
LOS: 1 days | End: 2019-11-04

## 2019-11-04 VITALS
HEIGHT: 60 IN | HEART RATE: 88 BPM | RESPIRATION RATE: 14 BRPM | TEMPERATURE: 98 F | WEIGHT: 128.09 LBS | DIASTOLIC BLOOD PRESSURE: 68 MMHG | OXYGEN SATURATION: 98 % | SYSTOLIC BLOOD PRESSURE: 128 MMHG

## 2019-11-04 DIAGNOSIS — Z90.49 ACQUIRED ABSENCE OF OTHER SPECIFIED PARTS OF DIGESTIVE TRACT: Chronic | ICD-10-CM

## 2019-11-04 DIAGNOSIS — Z98.49 CATARACT EXTRACTION STATUS, UNSPECIFIED EYE: Chronic | ICD-10-CM

## 2019-11-04 DIAGNOSIS — R91.1 SOLITARY PULMONARY NODULE: ICD-10-CM

## 2019-11-04 DIAGNOSIS — Z96.652 PRESENCE OF LEFT ARTIFICIAL KNEE JOINT: Chronic | ICD-10-CM

## 2019-11-04 DIAGNOSIS — I10 ESSENTIAL (PRIMARY) HYPERTENSION: ICD-10-CM

## 2019-11-04 DIAGNOSIS — E03.9 HYPOTHYROIDISM, UNSPECIFIED: ICD-10-CM

## 2019-11-04 DIAGNOSIS — Z90.89 ACQUIRED ABSENCE OF OTHER ORGANS: Chronic | ICD-10-CM

## 2019-11-04 LAB
ANION GAP SERPL CALC-SCNC: 12 MMO/L — SIGNIFICANT CHANGE UP (ref 7–14)
BLD GP AB SCN SERPL QL: NEGATIVE — SIGNIFICANT CHANGE UP
BUN SERPL-MCNC: 13 MG/DL — SIGNIFICANT CHANGE UP (ref 7–23)
CALCIUM SERPL-MCNC: 9.6 MG/DL — SIGNIFICANT CHANGE UP (ref 8.4–10.5)
CHLORIDE SERPL-SCNC: 91 MMOL/L — LOW (ref 98–107)
CO2 SERPL-SCNC: 27 MMOL/L — SIGNIFICANT CHANGE UP (ref 22–31)
CREAT SERPL-MCNC: 0.68 MG/DL — SIGNIFICANT CHANGE UP (ref 0.5–1.3)
GLUCOSE SERPL-MCNC: 98 MG/DL — SIGNIFICANT CHANGE UP (ref 70–99)
HCT VFR BLD CALC: 33.5 % — LOW (ref 34.5–45)
HGB BLD-MCNC: 11.2 G/DL — LOW (ref 11.5–15.5)
MCHC RBC-ENTMCNC: 31.7 PG — SIGNIFICANT CHANGE UP (ref 27–34)
MCHC RBC-ENTMCNC: 33.4 % — SIGNIFICANT CHANGE UP (ref 32–36)
MCV RBC AUTO: 94.9 FL — SIGNIFICANT CHANGE UP (ref 80–100)
NRBC # FLD: 0 K/UL — SIGNIFICANT CHANGE UP (ref 0–0)
PLATELET # BLD AUTO: 197 K/UL — SIGNIFICANT CHANGE UP (ref 150–400)
PMV BLD: 11.6 FL — SIGNIFICANT CHANGE UP (ref 7–13)
POTASSIUM SERPL-MCNC: 4.6 MMOL/L — SIGNIFICANT CHANGE UP (ref 3.5–5.3)
POTASSIUM SERPL-SCNC: 4.6 MMOL/L — SIGNIFICANT CHANGE UP (ref 3.5–5.3)
RBC # BLD: 3.53 M/UL — LOW (ref 3.8–5.2)
RBC # FLD: 12.4 % — SIGNIFICANT CHANGE UP (ref 10.3–14.5)
RH IG SCN BLD-IMP: POSITIVE — SIGNIFICANT CHANGE UP
SODIUM SERPL-SCNC: 130 MMOL/L — LOW (ref 135–145)
WBC # BLD: 6.29 K/UL — SIGNIFICANT CHANGE UP (ref 3.8–10.5)
WBC # FLD AUTO: 6.29 K/UL — SIGNIFICANT CHANGE UP (ref 3.8–10.5)

## 2019-11-04 RX ORDER — DOCUSATE SODIUM 100 MG
0 CAPSULE ORAL
Qty: 0 | Refills: 0 | DISCHARGE

## 2019-11-04 RX ORDER — SODIUM CHLORIDE 9 MG/ML
1000 INJECTION, SOLUTION INTRAVENOUS
Refills: 0 | Status: DISCONTINUED | OUTPATIENT
Start: 2019-11-07 | End: 2019-11-08

## 2019-11-04 RX ORDER — SODIUM CHLORIDE 9 MG/ML
3 INJECTION INTRAMUSCULAR; INTRAVENOUS; SUBCUTANEOUS EVERY 8 HOURS
Refills: 0 | Status: DISCONTINUED | OUTPATIENT
Start: 2019-11-07 | End: 2019-11-09

## 2019-11-04 NOTE — H&P PST ADULT - NEGATIVE GENERAL GENITOURINARY SYMPTOMS
no dysuria/normal urinary frequency/no hematuria no flank pain R/no dysuria/normal urinary frequency/no flank pain L/no hematuria

## 2019-11-04 NOTE — H&P PST ADULT - NEGATIVE GASTROINTESTINAL SYMPTOMS
no change in bowel habits/no constipation/no abdominal pain/no vomiting/no diarrhea/no nausea no abdominal pain/no change in bowel habits/no vomiting/no nausea/no diarrhea

## 2019-11-04 NOTE — H&P PST ADULT - NSICDXPROBLEM_GEN_ALL_CORE_FT
PROBLEM DIAGNOSES  Problem: Solitary pulmonary nodule  Assessment and Plan: Scheduled for Right Video Assisted Thoracoscopy, Right Lower Lobe Wedge Resection on 11/7/2019.  Preop instructions given, pt verbalized understanding   GI prophylaxis and Chlorhexidine wash with instructions provided- pt verbalized understanding using teach back method   Pt states she received  medical and cardiac clearance- copy of both clearances requested in PST with ECHO and Stress Test reports     Problem: HTN (hypertension)  Assessment and Plan: Pt will take Telmisartan and Carvediolol AM of surgery with sips of water    Problem: Hypothyroidism  Assessment and Plan: Pt will take Levothyroxine AM of surgery with sips of water

## 2019-11-04 NOTE — H&P PST ADULT - HISTORY OF PRESENT ILLNESS
89 y/o female presents to [facility] for preoperative evaluation with dx of solitary pulmonary nodule. h/o chronic lung nodules, under surveillance by Pulmonary. A CT of the chest in October, revealed changes in size of RLL nodule. Scheduled for Right Video Assisted Thoracoscopy, Right Lower Lobe Wedge Resection on 11/7/2019. Pt denies SOB, cough or wheezing. 89 y/o female presents to [facility] for preoperative evaluation with dx of solitary pulmonary nodule. h/o chronic lung nodules, under  surveillance by Pulmonary. A CT of the chest in October, revealed changes in size of RLL nodule. Scheduled for Right Video Assisted Thoracoscopy, Right Lower Lobe Wedge Resection on 11/7/2019. Pt denies SOB, cough or wheezing.

## 2019-11-04 NOTE — H&P PST ADULT - NSICDXPASTMEDICALHX_GEN_ALL_CORE_FT
PAST MEDICAL HISTORY:  Adult Hypothyroidism     Anisometropia     Cholelithiasis     HTN (Hypertension)     Hypercholesteremia     IBS (irritable bowel syndrome)     Lung nodule "chronic lung nodules"    Mini stroke 4-5 years ago    OA (osteoarthritis)     OAD (obstructive airway disease)     Solitary pulmonary nodule PAST MEDICAL HISTORY:  Adult Hypothyroidism     Anisometropia     Cholelithiasis     Hearing deficit, bilateral     HTN (Hypertension)     Hypercholesteremia     IBS (irritable bowel syndrome)     Lung nodule "chronic lung nodules"    Mini stroke 4-5 years ago    OA (osteoarthritis)     OAD (obstructive airway disease)     Solitary pulmonary nodule

## 2019-11-04 NOTE — H&P PST ADULT - NEGATIVE NEUROLOGICAL SYMPTOMS
no syncope/no loss of consciousness/no hemiparesis/no generalized seizures/no headache/no tremors/no facial palsy/no vertigo/no paresthesias/no transient paralysis/no weakness/no loss of sensation/no difficulty walking/no confusion

## 2019-11-04 NOTE — H&P PST ADULT - NEGATIVE OPHTHALMOLOGIC SYMPTOMS
no blurred vision L/no blurred vision R no blurred vision L/no loss of vision R/no blurred vision R/no loss of vision L

## 2019-11-04 NOTE — H&P PST ADULT - RS GEN PE MLT RESP DETAILS PC
respirations non-labored/clear to auscultation bilaterally/good air movement/breath sounds equal/airway patent/no chest wall tenderness/no wheezes

## 2019-11-04 NOTE — H&P PST ADULT - OTHER CARE PROVIDERS
Cardiologist Dr. Fajardo [phone] Pulmonologist Dr. Cifuentes Neurologist Dr. Clive Billingsley

## 2019-11-04 NOTE — H&P PST ADULT - ACTIVITY
performs home exercises daily, ADLs, climbs stairs performs home exercises daily, ADLs, climbs stairs in home

## 2019-11-04 NOTE — H&P PST ADULT - NEUROLOGICAL COMMENTS
"mini stroke 4-5 years ago" On Aspirin- followed by neurology yearly Dr. Clive Billingsley [phone] "mini stroke 4-5 years ago" On Aspirin- followed by neurology yearly by Dr. Clive Billingsley [phone]

## 2019-11-04 NOTE — H&P PST ADULT - NSICDXPASTSURGICALHX_GEN_ALL_CORE_FT
PAST SURGICAL HISTORY:  H/O total knee replacement, left     History of D&C 53 years ago    S/P cataract extraction     S/P cataract surgery 2003    S/P cholecystectomy     S/P tonsillectomy

## 2019-11-04 NOTE — H&P PST ADULT - REASON FOR ADMISSION
"I am removing something on my lung that is not cancerous" "I am removing something from my lung that is not cancerous"

## 2019-11-07 ENCOUNTER — APPOINTMENT (OUTPATIENT)
Dept: THORACIC SURGERY | Facility: HOSPITAL | Age: 84
End: 2019-11-07

## 2019-11-07 ENCOUNTER — RESULT REVIEW (OUTPATIENT)
Age: 84
End: 2019-11-07

## 2019-11-07 ENCOUNTER — INPATIENT (INPATIENT)
Facility: HOSPITAL | Age: 84
LOS: 1 days | Discharge: HOME HEALTH | End: 2019-11-09
Attending: THORACIC SURGERY (CARDIOTHORACIC VASCULAR SURGERY) | Admitting: THORACIC SURGERY (CARDIOTHORACIC VASCULAR SURGERY)
Payer: MEDICARE

## 2019-11-07 VITALS
OXYGEN SATURATION: 97 % | HEIGHT: 60 IN | SYSTOLIC BLOOD PRESSURE: 136 MMHG | TEMPERATURE: 98 F | HEART RATE: 71 BPM | DIASTOLIC BLOOD PRESSURE: 68 MMHG | WEIGHT: 128.09 LBS | RESPIRATION RATE: 16 BRPM

## 2019-11-07 DIAGNOSIS — Z90.49 ACQUIRED ABSENCE OF OTHER SPECIFIED PARTS OF DIGESTIVE TRACT: Chronic | ICD-10-CM

## 2019-11-07 DIAGNOSIS — Z96.652 PRESENCE OF LEFT ARTIFICIAL KNEE JOINT: Chronic | ICD-10-CM

## 2019-11-07 DIAGNOSIS — Z90.89 ACQUIRED ABSENCE OF OTHER ORGANS: Chronic | ICD-10-CM

## 2019-11-07 DIAGNOSIS — Z98.49 CATARACT EXTRACTION STATUS, UNSPECIFIED EYE: Chronic | ICD-10-CM

## 2019-11-07 DIAGNOSIS — R91.1 SOLITARY PULMONARY NODULE: ICD-10-CM

## 2019-11-07 LAB — RH IG SCN BLD-IMP: POSITIVE — SIGNIFICANT CHANGE UP

## 2019-11-07 PROCEDURE — 88307 TISSUE EXAM BY PATHOLOGIST: CPT | Mod: 26

## 2019-11-07 PROCEDURE — 99233 SBSQ HOSP IP/OBS HIGH 50: CPT

## 2019-11-07 PROCEDURE — 32666 THORACOSCOPY W/WEDGE RESECT: CPT

## 2019-11-07 PROCEDURE — 71045 X-RAY EXAM CHEST 1 VIEW: CPT | Mod: 26

## 2019-11-07 RX ORDER — CARVEDILOL PHOSPHATE 80 MG/1
12.5 CAPSULE, EXTENDED RELEASE ORAL EVERY 12 HOURS
Refills: 0 | Status: DISCONTINUED | OUTPATIENT
Start: 2019-11-07 | End: 2019-11-09

## 2019-11-07 RX ORDER — ASPIRIN/CALCIUM CARB/MAGNESIUM 324 MG
81 TABLET ORAL DAILY
Refills: 0 | Status: DISCONTINUED | OUTPATIENT
Start: 2019-11-08 | End: 2019-11-09

## 2019-11-07 RX ORDER — LEVOTHYROXINE SODIUM 125 MCG
1 TABLET ORAL
Qty: 0 | Refills: 0 | DISCHARGE

## 2019-11-07 RX ORDER — ATORVASTATIN CALCIUM 80 MG/1
10 TABLET, FILM COATED ORAL AT BEDTIME
Refills: 0 | Status: DISCONTINUED | OUTPATIENT
Start: 2019-11-07 | End: 2019-11-09

## 2019-11-07 RX ORDER — HEPARIN SODIUM 5000 [USP'U]/ML
5000 INJECTION INTRAVENOUS; SUBCUTANEOUS EVERY 12 HOURS
Refills: 0 | Status: DISCONTINUED | OUTPATIENT
Start: 2019-11-07 | End: 2019-11-09

## 2019-11-07 RX ORDER — ASPIRIN/CALCIUM CARB/MAGNESIUM 324 MG
1 TABLET ORAL
Qty: 0 | Refills: 0 | DISCHARGE

## 2019-11-07 RX ORDER — LEVOTHYROXINE SODIUM 125 MCG
75 TABLET ORAL DAILY
Refills: 0 | Status: DISCONTINUED | OUTPATIENT
Start: 2019-11-07 | End: 2019-11-09

## 2019-11-07 RX ORDER — HEPARIN SODIUM 5000 [USP'U]/ML
5000 INJECTION INTRAVENOUS; SUBCUTANEOUS ONCE
Refills: 0 | Status: COMPLETED | OUTPATIENT
Start: 2019-11-07 | End: 2019-11-07

## 2019-11-07 RX ORDER — SENNA PLUS 8.6 MG/1
2 TABLET ORAL AT BEDTIME
Refills: 0 | Status: DISCONTINUED | OUTPATIENT
Start: 2019-11-07 | End: 2019-11-09

## 2019-11-07 RX ORDER — TELMISARTAN 20 MG/1
1 TABLET ORAL
Qty: 0 | Refills: 0 | DISCHARGE

## 2019-11-07 RX ORDER — NALOXONE HYDROCHLORIDE 4 MG/.1ML
0.1 SPRAY NASAL
Refills: 0 | Status: DISCONTINUED | OUTPATIENT
Start: 2019-11-07 | End: 2019-11-08

## 2019-11-07 RX ORDER — ONDANSETRON 8 MG/1
4 TABLET, FILM COATED ORAL EVERY 6 HOURS
Refills: 0 | Status: DISCONTINUED | OUTPATIENT
Start: 2019-11-07 | End: 2019-11-09

## 2019-11-07 RX ORDER — HYDROMORPHONE HYDROCHLORIDE 2 MG/ML
0.3 INJECTION INTRAMUSCULAR; INTRAVENOUS; SUBCUTANEOUS
Refills: 0 | Status: DISCONTINUED | OUTPATIENT
Start: 2019-11-07 | End: 2019-11-08

## 2019-11-07 RX ORDER — ACETAMINOPHEN 500 MG
1000 TABLET ORAL ONCE
Refills: 0 | Status: DISCONTINUED | OUTPATIENT
Start: 2019-11-07 | End: 2019-11-08

## 2019-11-07 RX ORDER — CARVEDILOL PHOSPHATE 80 MG/1
1 CAPSULE, EXTENDED RELEASE ORAL
Qty: 0 | Refills: 0 | DISCHARGE

## 2019-11-07 RX ORDER — HYDROMORPHONE HYDROCHLORIDE 2 MG/ML
30 INJECTION INTRAMUSCULAR; INTRAVENOUS; SUBCUTANEOUS
Refills: 0 | Status: DISCONTINUED | OUTPATIENT
Start: 2019-11-07 | End: 2019-11-08

## 2019-11-07 RX ORDER — ATORVASTATIN CALCIUM 80 MG/1
1 TABLET, FILM COATED ORAL
Qty: 0 | Refills: 0 | DISCHARGE

## 2019-11-07 RX ADMIN — CARVEDILOL PHOSPHATE 12.5 MILLIGRAM(S): 80 CAPSULE, EXTENDED RELEASE ORAL at 22:05

## 2019-11-07 RX ADMIN — SENNA PLUS 2 TABLET(S): 8.6 TABLET ORAL at 22:06

## 2019-11-07 RX ADMIN — SODIUM CHLORIDE 30 MILLILITER(S): 9 INJECTION, SOLUTION INTRAVENOUS at 08:02

## 2019-11-07 RX ADMIN — HEPARIN SODIUM 5000 UNIT(S): 5000 INJECTION INTRAVENOUS; SUBCUTANEOUS at 18:31

## 2019-11-07 RX ADMIN — SODIUM CHLORIDE 3 MILLILITER(S): 9 INJECTION INTRAMUSCULAR; INTRAVENOUS; SUBCUTANEOUS at 22:07

## 2019-11-07 RX ADMIN — HYDROMORPHONE HYDROCHLORIDE 30 MILLILITER(S): 2 INJECTION INTRAMUSCULAR; INTRAVENOUS; SUBCUTANEOUS at 19:34

## 2019-11-07 RX ADMIN — SODIUM CHLORIDE 3 MILLILITER(S): 9 INJECTION INTRAMUSCULAR; INTRAVENOUS; SUBCUTANEOUS at 14:47

## 2019-11-07 RX ADMIN — HEPARIN SODIUM 5000 UNIT(S): 5000 INJECTION INTRAVENOUS; SUBCUTANEOUS at 08:02

## 2019-11-07 RX ADMIN — SODIUM CHLORIDE 30 MILLILITER(S): 9 INJECTION, SOLUTION INTRAVENOUS at 19:35

## 2019-11-07 RX ADMIN — ATORVASTATIN CALCIUM 10 MILLIGRAM(S): 80 TABLET, FILM COATED ORAL at 22:05

## 2019-11-07 NOTE — BRIEF OPERATIVE NOTE - BRIEF OP NOTE DRAINS
I have reviewed and agree with nurse's note and vital signs listed above.    SUBJECTIVE:  [place] comes in for Urgent care follow up for cellulitis. Pt went to urgent care on Saturday, October 29th due to redness and itching of her right leg. Pt also felt feverish.    She was started on Cefaroxil and has 2 doses left. Pt reports some improvement of redness and itching but still feels the leg is red and she still has some swelling.   The swelling is improved in the mornings and worse as the day goes on.   She has a history of chronic venous stasis and wears support stockings.  No pain in the calf.     No longer feels feverish.    Allergies and medications reviewed.    Current Outpatient Prescriptions   Medication Sig   • cefadroxil (DURICEF) 250 MG/5ML suspension Take 5 mLs by mouth 2 times daily for 10 days.   • butalbital-APAP-caffeine (FIORICET) -40 MG per tablet Take 1 tablet by mouth every 8 hours as needed for Headaches.   • LORazepam (ATIVAN) 0.5 MG tablet Take 1 tablet by mouth every 6 hours as needed for Anxiety.   • MACKENZIEE [place] needs a new manual wheelchair. Please fit so as not too small and not too short. Needs seat and back cushion also.  DX: Rheumatoid Arthritis.   • levothyroxine (SYNTHROID, LEVOTHROID) 75 MCG tablet Take 1 tablet by mouth daily.   • XELJANZ 5 MG tablet Take 5 mg by mouth 2 times daily.   • triamcinolone (ARISTOCORT) 0.1 % cream APPLY TOPICALLY TO LEFT LOWER LEG 2 TIMES DAILY FOR 1 WEEK, THEN AS NEEDED.   • omeprazole (PRILOSEC) 20 MG capsule Take 20 mg by mouth daily. Per Dr. Hernandez/Rheumatologist   • predniSONE (DELTASONE) 1 MG tablet Currently taking 5-6mg daily - Per Dr. Hernandez - pts Rheumatologist.   • fluticasone (FLONASE) 50 MCG/ACT nasal spray Spray 2 sprays in each nostril daily.   • Vitamin D, Ergocalciferol, 80196 UNITS capsule Once weekly.   • fexofenadine (ALLEGRA ALLERGY CHILDRENS) 30 MG/5ML suspension Take 10 mLs by mouth daily.   • loperamide (IMODIUM) 1  MG/5ML solution Take 2 mg by mouth 3 times daily as needed.   • SUMAtriptan (IMITREX) 25 MG tablet Take 1 tablet at onset of migraine, may repeat in 2 hrs if needed.   • lisinopril (ZESTRIL) 5 MG tablet Take 1 tablet by mouth daily. 0   • pseudoephedrine (SUDAFED) 30 MG tablet Take 1 tablet by mouth every 4 hours as needed for Congestion.   • Acetaminophen (TYLENOL EXTRA STRENGTH) 167 MG/5ML LIQD Take 30 mLs by mouth. Three to Four times daily.   • hydroxychloroquine (PLAQUENIL) 200 MG tablet Take 1 tablet by mouth daily.   • cephALEXin (KEFLEX) 250 MG/5ML suspension Take 4 teaspoons by mouth one hour prior to dental exam for joint replacement prophylaxis     No current facility-administered medications for this visit.        OBJECTIVE:  Visit Vitals  /88 (BP Location: [facility], Patient Position: Sitting, Cuff Size: Regular)   Pulse 103   Temp 98.9 °F (37.2 °C) (Oral)   SpO2 97%     General: No acute distress, alert, healthy appearing  Right lower extremity: There is erythema and warmth from the distal inner thigh region extending to the right leg. There is swelling of the right leg with shiny pink skin. There is no tenderness. Negative Homans sign.    ASSESSMENT:  1. Right lower extremity cellulitis-some improvement.    PLAN:  1. Recommend stopping the Cefadroxil and start Bactrim DS twice daily. Patient cannot swallow pills so she will crush the tablets.  2. Recheck in 1 week, sooner if worsening symptoms.  3. Flu vaccine.     R CHEST TUBE

## 2019-11-07 NOTE — ASU PATIENT PROFILE, ADULT - PSH
H/O total knee replacement, left    History of D&C  53 years ago  S/P cataract extraction    S/P cataract surgery  2003  S/P cholecystectomy    S/P tonsillectomy

## 2019-11-07 NOTE — ASU PATIENT PROFILE, ADULT - PMH
Adult Hypothyroidism    Anisometropia    Cholelithiasis    Hearing deficit, bilateral    HTN (Hypertension)    Hypercholesteremia    IBS (irritable bowel syndrome)    Lung nodule  "chronic lung nodules"  Mini stroke  4-5 years ago  OA (osteoarthritis)    OAD (obstructive airway disease)    Solitary pulmonary nodule

## 2019-11-07 NOTE — BRIEF OPERATIVE NOTE - NSICDXBRIEFPROCEDURE_GEN_ALL_CORE_FT
PROCEDURES:  Bronchoscopy, adult 07-Nov-2019 10:34:48  Kate Johnson  VATS, with lung resection 07-Nov-2019 10:34:35 R VATS, RLL wedge Kate Johnson

## 2019-11-08 LAB
ANION GAP SERPL CALC-SCNC: 13 MMO/L — SIGNIFICANT CHANGE UP (ref 7–14)
APTT BLD: 33.3 SEC — SIGNIFICANT CHANGE UP (ref 27.5–36.3)
BASOPHILS # BLD AUTO: 0.02 K/UL — SIGNIFICANT CHANGE UP (ref 0–0.2)
BASOPHILS NFR BLD AUTO: 0.2 % — SIGNIFICANT CHANGE UP (ref 0–2)
BUN SERPL-MCNC: 20 MG/DL — SIGNIFICANT CHANGE UP (ref 7–23)
CALCIUM SERPL-MCNC: 8.6 MG/DL — SIGNIFICANT CHANGE UP (ref 8.4–10.5)
CHLORIDE SERPL-SCNC: 93 MMOL/L — LOW (ref 98–107)
CO2 SERPL-SCNC: 24 MMOL/L — SIGNIFICANT CHANGE UP (ref 22–31)
CREAT SERPL-MCNC: 0.8 MG/DL — SIGNIFICANT CHANGE UP (ref 0.5–1.3)
EOSINOPHIL # BLD AUTO: 0.14 K/UL — SIGNIFICANT CHANGE UP (ref 0–0.5)
EOSINOPHIL NFR BLD AUTO: 1.6 % — SIGNIFICANT CHANGE UP (ref 0–6)
GLUCOSE SERPL-MCNC: 126 MG/DL — HIGH (ref 70–99)
HCT VFR BLD CALC: 30.4 % — LOW (ref 34.5–45)
HGB BLD-MCNC: 10.2 G/DL — LOW (ref 11.5–15.5)
IMM GRANULOCYTES NFR BLD AUTO: 0.4 % — SIGNIFICANT CHANGE UP (ref 0–1.5)
INR BLD: 1.13 — SIGNIFICANT CHANGE UP (ref 0.88–1.17)
LYMPHOCYTES # BLD AUTO: 1.59 K/UL — SIGNIFICANT CHANGE UP (ref 1–3.3)
LYMPHOCYTES # BLD AUTO: 17.6 % — SIGNIFICANT CHANGE UP (ref 13–44)
MCHC RBC-ENTMCNC: 32.4 PG — SIGNIFICANT CHANGE UP (ref 27–34)
MCHC RBC-ENTMCNC: 33.6 % — SIGNIFICANT CHANGE UP (ref 32–36)
MCV RBC AUTO: 96.5 FL — SIGNIFICANT CHANGE UP (ref 80–100)
MONOCYTES # BLD AUTO: 0.8 K/UL — SIGNIFICANT CHANGE UP (ref 0–0.9)
MONOCYTES NFR BLD AUTO: 8.9 % — SIGNIFICANT CHANGE UP (ref 2–14)
NEUTROPHILS # BLD AUTO: 6.44 K/UL — SIGNIFICANT CHANGE UP (ref 1.8–7.4)
NEUTROPHILS NFR BLD AUTO: 71.3 % — SIGNIFICANT CHANGE UP (ref 43–77)
NRBC # FLD: 0 K/UL — SIGNIFICANT CHANGE UP (ref 0–0)
PLATELET # BLD AUTO: 168 K/UL — SIGNIFICANT CHANGE UP (ref 150–400)
PMV BLD: 10.3 FL — SIGNIFICANT CHANGE UP (ref 7–13)
POTASSIUM SERPL-MCNC: 4.5 MMOL/L — SIGNIFICANT CHANGE UP (ref 3.5–5.3)
POTASSIUM SERPL-SCNC: 4.5 MMOL/L — SIGNIFICANT CHANGE UP (ref 3.5–5.3)
PROTHROM AB SERPL-ACNC: 12.9 SEC — SIGNIFICANT CHANGE UP (ref 9.8–13.1)
RBC # BLD: 3.15 M/UL — LOW (ref 3.8–5.2)
RBC # FLD: 12.5 % — SIGNIFICANT CHANGE UP (ref 10.3–14.5)
SODIUM SERPL-SCNC: 130 MMOL/L — LOW (ref 135–145)
WBC # BLD: 9.03 K/UL — SIGNIFICANT CHANGE UP (ref 3.8–10.5)
WBC # FLD AUTO: 9.03 K/UL — SIGNIFICANT CHANGE UP (ref 3.8–10.5)

## 2019-11-08 PROCEDURE — 99233 SBSQ HOSP IP/OBS HIGH 50: CPT

## 2019-11-08 PROCEDURE — 71045 X-RAY EXAM CHEST 1 VIEW: CPT | Mod: 26

## 2019-11-08 RX ORDER — OXYCODONE HYDROCHLORIDE 5 MG/1
5 TABLET ORAL EVERY 6 HOURS
Refills: 0 | Status: DISCONTINUED | OUTPATIENT
Start: 2019-11-08 | End: 2019-11-09

## 2019-11-08 RX ORDER — IPRATROPIUM/ALBUTEROL SULFATE 18-103MCG
3 AEROSOL WITH ADAPTER (GRAM) INHALATION EVERY 6 HOURS
Refills: 0 | Status: DISCONTINUED | OUTPATIENT
Start: 2019-11-08 | End: 2019-11-09

## 2019-11-08 RX ORDER — ACETAMINOPHEN 500 MG
650 TABLET ORAL EVERY 6 HOURS
Refills: 0 | Status: DISCONTINUED | OUTPATIENT
Start: 2019-11-08 | End: 2019-11-09

## 2019-11-08 RX ADMIN — HYDROMORPHONE HYDROCHLORIDE 30 MILLILITER(S): 2 INJECTION INTRAMUSCULAR; INTRAVENOUS; SUBCUTANEOUS at 07:34

## 2019-11-08 RX ADMIN — SENNA PLUS 2 TABLET(S): 8.6 TABLET ORAL at 23:23

## 2019-11-08 RX ADMIN — SODIUM CHLORIDE 30 MILLILITER(S): 9 INJECTION, SOLUTION INTRAVENOUS at 07:36

## 2019-11-08 RX ADMIN — Medication 75 MICROGRAM(S): at 06:38

## 2019-11-08 RX ADMIN — Medication 650 MILLIGRAM(S): at 13:32

## 2019-11-08 RX ADMIN — HEPARIN SODIUM 5000 UNIT(S): 5000 INJECTION INTRAVENOUS; SUBCUTANEOUS at 06:38

## 2019-11-08 RX ADMIN — CARVEDILOL PHOSPHATE 12.5 MILLIGRAM(S): 80 CAPSULE, EXTENDED RELEASE ORAL at 06:38

## 2019-11-08 RX ADMIN — SODIUM CHLORIDE 3 MILLILITER(S): 9 INJECTION INTRAMUSCULAR; INTRAVENOUS; SUBCUTANEOUS at 06:41

## 2019-11-08 RX ADMIN — ATORVASTATIN CALCIUM 10 MILLIGRAM(S): 80 TABLET, FILM COATED ORAL at 23:23

## 2019-11-08 RX ADMIN — SODIUM CHLORIDE 3 MILLILITER(S): 9 INJECTION INTRAMUSCULAR; INTRAVENOUS; SUBCUTANEOUS at 21:09

## 2019-11-08 RX ADMIN — CARVEDILOL PHOSPHATE 12.5 MILLIGRAM(S): 80 CAPSULE, EXTENDED RELEASE ORAL at 17:36

## 2019-11-08 RX ADMIN — HEPARIN SODIUM 5000 UNIT(S): 5000 INJECTION INTRAVENOUS; SUBCUTANEOUS at 17:36

## 2019-11-08 RX ADMIN — Medication 650 MILLIGRAM(S): at 23:24

## 2019-11-08 RX ADMIN — SODIUM CHLORIDE 3 MILLILITER(S): 9 INJECTION INTRAMUSCULAR; INTRAVENOUS; SUBCUTANEOUS at 13:33

## 2019-11-08 RX ADMIN — Medication 81 MILLIGRAM(S): at 13:31

## 2019-11-09 ENCOUNTER — TRANSCRIPTION ENCOUNTER (OUTPATIENT)
Age: 84
End: 2019-11-09

## 2019-11-09 VITALS
DIASTOLIC BLOOD PRESSURE: 68 MMHG | RESPIRATION RATE: 18 BRPM | OXYGEN SATURATION: 96 % | TEMPERATURE: 98 F | SYSTOLIC BLOOD PRESSURE: 146 MMHG | HEART RATE: 73 BPM

## 2019-11-09 PROCEDURE — 71045 X-RAY EXAM CHEST 1 VIEW: CPT | Mod: 26,77

## 2019-11-09 PROCEDURE — 71045 X-RAY EXAM CHEST 1 VIEW: CPT | Mod: 26,76

## 2019-11-09 RX ORDER — LOSARTAN POTASSIUM 100 MG/1
100 TABLET, FILM COATED ORAL DAILY
Refills: 0 | Status: DISCONTINUED | OUTPATIENT
Start: 2019-11-09 | End: 2019-11-09

## 2019-11-09 RX ORDER — POLYETHYLENE GLYCOL 3350 17 G/17G
17 POWDER, FOR SOLUTION ORAL DAILY
Refills: 0 | Status: DISCONTINUED | OUTPATIENT
Start: 2019-11-09 | End: 2019-11-09

## 2019-11-09 RX ORDER — OXYCODONE HYDROCHLORIDE 5 MG/1
1 TABLET ORAL
Qty: 8 | Refills: 0
Start: 2019-11-09 | End: 2019-11-10

## 2019-11-09 RX ORDER — ACETAMINOPHEN 500 MG
2 TABLET ORAL
Qty: 0 | Refills: 0 | DISCHARGE
Start: 2019-11-09

## 2019-11-09 RX ORDER — POLYETHYLENE GLYCOL 3350 17 G/17G
17 POWDER, FOR SOLUTION ORAL
Qty: 0 | Refills: 0 | DISCHARGE
Start: 2019-11-09

## 2019-11-09 RX ADMIN — SODIUM CHLORIDE 3 MILLILITER(S): 9 INJECTION INTRAMUSCULAR; INTRAVENOUS; SUBCUTANEOUS at 12:49

## 2019-11-09 RX ADMIN — CARVEDILOL PHOSPHATE 12.5 MILLIGRAM(S): 80 CAPSULE, EXTENDED RELEASE ORAL at 05:29

## 2019-11-09 RX ADMIN — Medication 75 MICROGRAM(S): at 05:29

## 2019-11-09 RX ADMIN — SODIUM CHLORIDE 3 MILLILITER(S): 9 INJECTION INTRAMUSCULAR; INTRAVENOUS; SUBCUTANEOUS at 05:28

## 2019-11-09 RX ADMIN — Medication 81 MILLIGRAM(S): at 11:32

## 2019-11-09 RX ADMIN — Medication 650 MILLIGRAM(S): at 17:17

## 2019-11-09 RX ADMIN — LOSARTAN POTASSIUM 100 MILLIGRAM(S): 100 TABLET, FILM COATED ORAL at 12:48

## 2019-11-09 RX ADMIN — HEPARIN SODIUM 5000 UNIT(S): 5000 INJECTION INTRAVENOUS; SUBCUTANEOUS at 17:13

## 2019-11-09 RX ADMIN — CARVEDILOL PHOSPHATE 12.5 MILLIGRAM(S): 80 CAPSULE, EXTENDED RELEASE ORAL at 17:12

## 2019-11-09 RX ADMIN — Medication 650 MILLIGRAM(S): at 05:29

## 2019-11-09 RX ADMIN — Medication 650 MILLIGRAM(S): at 11:31

## 2019-11-09 RX ADMIN — Medication 650 MILLIGRAM(S): at 11:35

## 2019-11-09 RX ADMIN — HEPARIN SODIUM 5000 UNIT(S): 5000 INJECTION INTRAVENOUS; SUBCUTANEOUS at 05:29

## 2019-11-09 RX ADMIN — Medication 650 MILLIGRAM(S): at 17:12

## 2019-11-09 RX ADMIN — POLYETHYLENE GLYCOL 3350 17 GRAM(S): 17 POWDER, FOR SOLUTION ORAL at 11:32

## 2019-11-09 NOTE — DISCHARGE NOTE PROVIDER - NSDCCPCAREPLAN_GEN_ALL_CORE_FT
PRINCIPAL DISCHARGE DIAGNOSIS  Diagnosis: Solitary pulmonary nodule  Assessment and Plan of Treatment:

## 2019-11-09 NOTE — DISCHARGE NOTE PROVIDER - HOSPITAL COURSE
89 y/o female presented with a suspicious solitary pulmonary nodule as her chronic lung nodules had already been under  surveillance by Pulmonary. A CT of the chest in October, revealed changes in size of the RLL nodule so she underwent a Right Video Assisted Thoracoscopy, Right Lower Lobe Wedge Resection on 11/7/2019. Post-op, she was discharged after chest tube removal 89 y/o female presented with a suspicious solitary pulmonary nodule as her chronic lung nodules had already been under  surveillance by Pulmonary. A CT of the chest in October, revealed changes in size of the RLL nodule so she underwent a Right Video Assisted Thoracoscopy, Right Lower Lobe Wedge Resection on 11/7/2019. Post-op, patient w/ severe pain and high chest tube output. 11/9 Chest tube removed and follow CXR stable, patient  discharged home with PT and home care. 89 y/o female presented with a suspicious solitary pulmonary nodule as her chronic lung nodules had already been under  surveillance by Pulmonary. A CT of the chest in October, revealed changes in size of the RLL nodule so she underwent a Right Video Assisted Thoracoscopy, Right Lower Lobe Wedge Resection on 11/7/2019. Post-op, patient w/ severe pain and high chest tube output. 11/9 Chest tube removed and follow CXR w/ a stable right apical ptx. patient discharged home with PT and home care.

## 2019-11-09 NOTE — DISCHARGE NOTE NURSING/CASE MANAGEMENT/SOCIAL WORK - NSDCPNINST_GEN_ALL_CORE
Call 911 for chest pain, and/or difficulty breathing. Report to your doctor any fever, pus to your incisions.

## 2019-11-09 NOTE — DISCHARGE NOTE NURSING/CASE MANAGEMENT/SOCIAL WORK - NSDCVIVACCINE_GEN_ALL_CORE_FT
Received call from [facility] scheduling stating that patient needs imaging requisition redone with procedure code of IMG 1053 for CT of chest , abdominal , and pelvis with contrast ( combination procedure ) ; order currently entered is incorrect per scheduling.
No Vaccines Administered.

## 2019-11-09 NOTE — DISCHARGE NOTE PROVIDER - NSDCACTIVITY_GEN_ALL_CORE
Sex allowed/Do not drive or operate machinery/Do not make important decisions/Stairs allowed/Walking - Indoors allowed/Showering allowed/No heavy lifting/straining/Walking - Outdoors allowed Do not drive or operate machinery/No heavy lifting/straining/Walking - Indoors allowed/Stairs allowed/Showering allowed/Do not make important decisions/Walking - Outdoors allowed

## 2019-11-09 NOTE — DISCHARGE NOTE PROVIDER - CARE PROVIDER_API CALL
Clive Leger)  Thoracic Surgery  [id number] [place] [address]  Phone: [phone]  Fax: [phone]  Follow Up Time:

## 2019-11-09 NOTE — DISCHARGE NOTE PROVIDER - NSDCCPTREATMENT_GEN_ALL_CORE_FT
PRINCIPAL PROCEDURE  Procedure: VATS, with lung resection  Findings and Treatment: R VATS, RLL wedge

## 2019-11-09 NOTE — DISCHARGE NOTE NURSING/CASE MANAGEMENT/SOCIAL WORK - NSSCTYPOFSERV_GEN_ALL_CORE
A nurse and a physical therapist will call on the day after your hospital discharge to arrange for a visit.  The VN will assess you for HHA hours

## 2019-11-09 NOTE — DISCHARGE NOTE PROVIDER - NSDCFUADDINST_GEN_ALL_CORE_FT
Keep the wound clean and dry and watch for pus, redness, or fevers and if noted, call Dr Leger - Leave dressing intact for 24 hrs by reinforcing with tape if necessary. At that time you may remove the dressing and take a shower. Place clean gauze over wound if continual drainage. Continue with daily ambulation and use of incentive spirometer.   (The suture will be removed in the office).  - Call the office if you experience any fevers, shortness of breath, chest pain or excessive or purulent drainage from the incision site, leg swelling day or night. Go to the emergency room if any of these symptoms are severe.   - Take your medications as ordered and take a stool softener if needed with the narcotic medications.  - Call Dr. Leger's office [phone] tomorrow or the next business day to make a followup appointment.

## 2019-11-09 NOTE — DISCHARGE NOTE NURSING/CASE MANAGEMENT/SOCIAL WORK - PATIENT PORTAL LINK FT
You can access the FollowMyHealth Patient Portal offered by [facility] by registering at the following website: [email or web address]. By joining [facility]’s FollowMyHealth portal, you will also be able to view your health information using other applications (apps) compatible with our system.

## 2019-11-09 NOTE — DISCHARGE NOTE PROVIDER - NSDCMRMEDTOKEN_GEN_ALL_CORE_FT
aspirin 81 mg oral tablet: 1 tab(s) orally once a day  atorvastatin 10 mg oral tablet: 1 tab(s) orally once a day  carvedilol 12.5 mg oral tablet: 1 tab(s) orally 2 times a day  levothyroxine 75 mcg (0.075 mg) oral tablet: 1 tab(s) orally once a day  MVI 1 tab orally daily  last dose 11/4:   Jody Colon  1 tab daily:   stool softener  1 cap orally daily:   telmisartan 80 mg oral tablet: 1 tab(s) orally once a day acetaminophen 325 mg oral tablet: 2 tab(s) orally every 6 hours  aspirin 81 mg oral tablet: 1 tab(s) orally once a day  atorvastatin 10 mg oral tablet: 1 tab(s) orally once a day  carvedilol 12.5 mg oral tablet: 1 tab(s) orally 2 times a day  levothyroxine 75 mcg (0.075 mg) oral tablet: 1 tab(s) orally once a day  oxyCODONE 5 mg oral tablet: 1 tab(s) orally every 6 hours, As needed, Severe Pain MDD:4  polyethylene glycol 3350 oral powder for reconstitution: 17 gram(s) orally once a day  telmisartan 80 mg oral tablet: 1 tab(s) orally once a day

## 2019-11-11 PROBLEM — M19.90 UNSPECIFIED OSTEOARTHRITIS, UNSPECIFIED SITE: Chronic | Status: ACTIVE | Noted: 2019-11-04

## 2019-11-11 PROBLEM — H52.31 ANISOMETROPIA: Chronic | Status: ACTIVE | Noted: 2019-11-04

## 2019-11-11 PROBLEM — H91.93 UNSPECIFIED HEARING LOSS, BILATERAL: Chronic | Status: ACTIVE | Noted: 2019-11-04

## 2019-11-11 PROBLEM — R91.1 SOLITARY PULMONARY NODULE: Chronic | Status: ACTIVE | Noted: 2019-11-04

## 2019-11-11 PROBLEM — I63.9 CEREBRAL INFARCTION, UNSPECIFIED: Chronic | Status: ACTIVE | Noted: 2019-11-04

## 2019-11-11 PROBLEM — J44.9 CHRONIC OBSTRUCTIVE PULMONARY DISEASE, UNSPECIFIED: Chronic | Status: ACTIVE | Noted: 2019-11-04

## 2019-11-12 LAB — SURGICAL PATHOLOGY STUDY: SIGNIFICANT CHANGE UP

## 2019-11-20 ENCOUNTER — APPOINTMENT (OUTPATIENT)
Dept: THORACIC SURGERY | Facility: CLINIC | Age: 84
End: 2019-11-20
Payer: MEDICARE

## 2019-11-20 VITALS
BODY MASS INDEX: 24.94 KG/M2 | RESPIRATION RATE: 15 BRPM | WEIGHT: 127 LBS | TEMPERATURE: 97.5 F | SYSTOLIC BLOOD PRESSURE: 163 MMHG | OXYGEN SATURATION: 99 % | HEIGHT: 60 IN | DIASTOLIC BLOOD PRESSURE: 72 MMHG | HEART RATE: 72 BPM

## 2019-11-20 PROCEDURE — 99024 POSTOP FOLLOW-UP VISIT: CPT

## 2019-11-26 ENCOUNTER — APPOINTMENT (OUTPATIENT)
Dept: PULMONOLOGY | Facility: CLINIC | Age: 84
End: 2019-11-26
Payer: MEDICARE

## 2019-11-26 VITALS
BODY MASS INDEX: 24.61 KG/M2 | SYSTOLIC BLOOD PRESSURE: 142 MMHG | WEIGHT: 126 LBS | HEART RATE: 82 BPM | OXYGEN SATURATION: 97 % | DIASTOLIC BLOOD PRESSURE: 72 MMHG

## 2019-11-26 PROCEDURE — 99214 OFFICE O/P EST MOD 30 MIN: CPT

## 2019-11-27 ENCOUNTER — NON-APPOINTMENT (OUTPATIENT)
Age: 84
End: 2019-11-27

## 2019-11-27 ENCOUNTER — APPOINTMENT (OUTPATIENT)
Dept: OPHTHALMOLOGY | Facility: CLINIC | Age: 84
End: 2019-11-27
Payer: MEDICARE

## 2019-11-27 PROCEDURE — 92012 INTRM OPH EXAM EST PATIENT: CPT

## 2019-12-05 NOTE — HISTORY OF PRESENT ILLNESS
[FreeTextEntry1] : 89 yo female with mild OAD , post recent VATS resection of early stage adenocarcinoma, presents for follow up. The patient feels "good" without need to use albuterol.

## 2019-12-05 NOTE — DISCUSSION/SUMMARY
[FreeTextEntry1] : 89 yo female with stable pulmonary exam post VATS wedge resection of adenocarcinoma of the right middle lobe. The patient is to have a repeat chest CT as recommended by CT surgery. She is to use albuterol MDI PRN as before. She is to follow up with her PMD as before.

## 2019-12-05 NOTE — REVIEW OF SYSTEMS
[Cough] : no cough [Sputum] : not coughing up ~M sputum [Hemoptysis] : no hemoptysis [Wheezing] : no wheezing [Dyspnea] : no dyspnea [Hypertension] : ~T hypertension [Negative] : Endocrine

## 2019-12-05 NOTE — PHYSICAL EXAM
[General Appearance - Well Developed] : well developed [Normal Appearance] : normal appearance [Well Groomed] : well groomed [General Appearance - Well Nourished] : well nourished [No Deformities] : no deformities [General Appearance - In No Acute Distress] : no acute distress [Normal Conjunctiva] : the conjunctiva exhibited no abnormalities [Eyelids - No Xanthelasma] : the eyelids demonstrated no xanthelasmas [Normal Oropharynx] : normal oropharynx [Neck Cervical Mass (___cm)] : no neck mass was observed [Neck Appearance] : the appearance of the neck was normal [Jugular Venous Distention Increased] : there was no jugular-venous distention [Thyroid Diffuse Enlargement] : the thyroid was not enlarged [Heart Rate And Rhythm] : heart rate and rhythm were normal [Thyroid Nodule] : there were no palpable thyroid nodules [Murmurs] : no murmurs present [Heart Sounds] : normal S1 and S2 [Edema] : no peripheral edema present [Veins - Varicosity Changes] : no varicosital changes were noted in the lower extremities [Respiration, Rhythm And Depth] : normal respiratory rhythm and effort [Exaggerated Use Of Accessory Muscles For Inspiration] : no accessory muscle use [Auscultation Breath Sounds / Voice Sounds] : lungs were clear to auscultation bilaterally [Abdomen Soft] : soft [Abdomen Tenderness] : non-tender [Abdomen Mass (___ Cm)] : no abdominal mass palpated [Nail Clubbing] : no clubbing of the fingernails [Cyanosis, Localized] : no localized cyanosis [Petechial Hemorrhages (___cm)] : no petechial hemorrhages [Skin Color & Pigmentation] : normal skin color and pigmentation [] : no rash [No Venous Stasis] : no venous stasis [Skin Lesions] : no skin lesions [No Skin Ulcers] : no skin ulcer [No Xanthoma] : no  xanthoma was observed [No Focal Deficits] : no focal deficits [Oriented To Time, Place, And Person] : oriented to person, place, and time [Impaired Insight] : insight and judgment were intact [Affect] : the affect was normal

## 2020-02-03 NOTE — PROGRESS NOTE ADULT - SUBJECTIVE AND OBJECTIVE BOX
Anesthesia Pain Management Service    SUBJECTIVE: Patient is doing well with IV PCA and no significant problems reported.    Pain Scale Score	At rest: _2/10__ 	With Activity: ___ 	[X ] Refer to charted pain scores    THERAPY:    [ ] IV PCA Morphine		[ ] 5 mg/mL	[ ] 1 mg/mL  [X ] IV PCA Hydromorphone	[ ] 5 mg/mL	[X ] 1 mg/mL  [ ] IV PCA Fentanyl		[ ] 50 micrograms/mL    Demand dose __0.2_ lockout __6_ (minutes) Continuous Rate _0__ Total: __0.8_  mg used (in past 24 hours)      MEDICATIONS  (STANDING):  acetaminophen  IVPB .. 1000 milliGRAM(s) IV Intermittent once  aspirin enteric coated 81 milliGRAM(s) Oral daily  atorvastatin 10 milliGRAM(s) Oral at bedtime  carvedilol 12.5 milliGRAM(s) Oral every 12 hours  heparin  Injectable 5000 Unit(s) SubCutaneous every 12 hours  levothyroxine 75 MICROGram(s) Oral daily  senna 2 Tablet(s) Oral at bedtime  sodium chloride 0.9% lock flush 3 milliLiter(s) IV Push every 8 hours    MEDICATIONS  (PRN):  ondansetron Injectable 4 milliGRAM(s) IV Push every 6 hours PRN Nausea  oxyCODONE    IR 5 milliGRAM(s) Oral every 6 hours PRN Moderate Pain (4 - 6)      OBJECTIVE:  Patient sitting up in chair, with CT.    Sedation Score:	[ X] Alert	[ ] Drowsy 	[ ] Arousable	[ ] Asleep	[ ] Unresponsive    Side Effects:	[X ] None	[ ] Nausea	[ ] Vomiting	[ ] Pruritus  		[ ] Other:    Vital Signs Last 24 Hrs  T(C): 36.8 (08 Nov 2019 08:00), Max: 37.1 (08 Nov 2019 00:00)  T(F): 98.3 (08 Nov 2019 08:00), Max: 98.7 (08 Nov 2019 00:00)  HR: 69 (08 Nov 2019 10:00) (60 - 93)  BP: 103/56 (08 Nov 2019 10:00) (103/56 - 167/76)  BP(mean): 67 (08 Nov 2019 10:00) (60 - 117)  RR: 20 (08 Nov 2019 10:00) (16 - 27)  SpO2: 97% (08 Nov 2019 10:00) (94% - 100%)    ASSESSMENT/ PLAN    Therapy to  be:	[ X] Continue   [ ] Discontinued   [ ] Change to prn Analgesics    Documentation and Verification of current medications:   [X] Done	[ ] Not done, not elligible    Comments:  Continue current pain regimen, with po Tylenol.
Anesthesia Pain Management Service- Attending Addendum    SUBJECTIVE: Patient's pain control adequate    Therapy:	  [ X] IV PCA	   [ ] Epidural           [ ] s/p Spinal Opoid              [ ] Postpartum infusion	  [ ] Patient controlled regional anesthesia (PCRA)    [ ] prn Analgesics    Allergies    No Known Allergies    Intolerances      MEDICATIONS  (STANDING):  acetaminophen   Tablet .. 650 milliGRAM(s) Oral every 6 hours  aspirin enteric coated 81 milliGRAM(s) Oral daily  atorvastatin 10 milliGRAM(s) Oral at bedtime  carvedilol 12.5 milliGRAM(s) Oral every 12 hours  heparin  Injectable 5000 Unit(s) SubCutaneous every 12 hours  levothyroxine 75 MICROGram(s) Oral daily  senna 2 Tablet(s) Oral at bedtime  sodium chloride 0.9% lock flush 3 milliLiter(s) IV Push every 8 hours    MEDICATIONS  (PRN):  ondansetron Injectable 4 milliGRAM(s) IV Push every 6 hours PRN Nausea  oxyCODONE    IR 5 milliGRAM(s) Oral every 6 hours PRN Moderate Pain (4 - 6)      OBJECTIVE:   [X] No new signs     [ ] Other:    Side Effects:  [X ] None			[ ] Other:      ASSESSMENT/PLAN  -Discontinue current therapy    [ ] Therapy changed to:    [ ] IV PCA       [ ] Epidural     [ X] prn Analgesics     Comments: Pain management per primary team, APS to sign off
MATTHEW CROWELL  MRN-[id number]  _________________________  VITAL SIGNS:  Vital Signs Last 24 Hrs  T(C): 36.8 (07 Nov 2019 20:00), Max: 36.8 (07 Nov 2019 20:00)  T(F): 98.3 (07 Nov 2019 20:00), Max: 98.3 (07 Nov 2019 20:00)  HR: 79 (07 Nov 2019 19:00) (60 - 80)  BP: 124/57 (07 Nov 2019 19:00) (110/86 - 167/76)  BP(mean): 75 (07 Nov 2019 19:00) (69 - 117)  RR: 23 (07 Nov 2019 19:00) (16 - 23)  SpO2: 95% (07 Nov 2019 19:00) (95% - 99%)  I/Os:   I&O's Detail    07 Nov 2019 07:01  -  07 Nov 2019 20:25  --------------------------------------------------------  IN:    lactated ringers.: 300 mL    Oral Fluid: 340 mL  Total IN: 640 mL    OUT:    Chest Tube: 290 mL    Voided: 800 mL  Total OUT: 1090 mL    Total NET: -450 mL              MEDICATIONS:  MEDICATIONS  (STANDING):  atorvastatin 10 milliGRAM(s) Oral at bedtime  carvedilol 12.5 milliGRAM(s) Oral every 12 hours  heparin  Injectable 5000 Unit(s) SubCutaneous every 12 hours  HYDROmorphone PCA (1 mG/mL) 30 milliLiter(s) PCA Continuous PCA Continuous  lactated ringers. 1000 milliLiter(s) (30 mL/Hr) IV Continuous <Continuous>  levothyroxine 75 MICROGram(s) Oral daily  senna 2 Tablet(s) Oral at bedtime  sodium chloride 0.9% lock flush 3 milliLiter(s) IV Push every 8 hours    MEDICATIONS  (PRN):  HYDROmorphone PCA (1 mG/mL) Rescue Clinician Bolus 0.3 milliGRAM(s) IV Push every 15 minutes PRN for Pain Scale GREATER THAN 6  naloxone Injectable 0.1 milliGRAM(s) IV Push every 3 minutes PRN For ANY of the following changes in patient status:  A. RR LESS THAN 10 breaths per minute, B. Oxygen saturation LESS THAN 90%, C. Sedation score of 6  ondansetron Injectable 4 milliGRAM(s) IV Push every 6 hours PRN Nausea      LABS:                  _________________________      PROCEDURE: R VATS, RLL wedge   07-Nov-2019      ISSUES:   Lung nodule  Hypothyroidism   HTN (Hypertension)   Mini stroke 4-5 years ago  OA (osteoarthritis)   COPD  Post op pain  chest tube in place      INTERVAL EVENTS:   OR today. Extubated in OR. Transferred to CTICU    HISTORY:   Patient reports mild sharp pain at surgical sites which is improved with use of pain meds and worse with deep breathing and movement. There is no associated fever, cough, nausea, or vomiting.    PHYSICAL EXAM:   Gen: Comfortable, No acute distress  Eyes: Sclera white, Conjunctiva normal, Eyelids normal, Pupils symmetrical   ENT: Mucous membranes moist,  ,  ,    Neck: Trachea midline,  ,  ,  ,    CV: Rate regular, Rhythm regular,    Resp: Breath sounds clear, No accessory muscles use, R chest tube in place,  ,    Abd: Soft, Non-distended, Non-tender, Bowel sounds normal,  ,    Skin: Warm, No peripheral edema of lower extremities,    : No hendrickson  Neuro: Moving all 4 extremities,    Psych: A&Ox3      ASSESSMENT AND PLAN:     NEURO:  Post-operative Pain - Pain control with PCA and Tylenol IV PRN.                    RESPIRATORY:  Stable on room air - Incentive spirometry. Chest PT and suctioning of secretions. Out of bed to chair and ambulate with assistance. Continuous pulse oximetry for support & to prevent decompensation.    Chest tube – Pleurevac regulated suctioning. Monitor chest tube output.                    CARDIOVASCULAR:  Hemodynamically stable - Not on pressors. Continue hemodynamic monitoring.  HTN - stable. Hold home antihypertensives for now.  CAD - stable. Continue aspirin.                 RENAL:  Stable – LR IVF 30mL/hr. Monitor IOs and electrolytes.          GASTROINTESTINAL:  GI prophylaxis not indicated  Zofran and Reglan IV PRN for nausea  Regular consistency diet              HEMATOLOGIC:  No signs of active bleeding. Monitor Hgb in CBC in AM  DVT prophylaxis with heparin subQ and SCDs.      INFECTIOUS DISEASE:  All surgical sites appear clean. No signs of active infection. Will monitor for fever and leukocytosis.                        ENDOCRINE:  Stable – Monitor glucose fingersticks for goal 120-180.  Hypothyroidism - stable. Continue Synthroid.         Pertinent clinical, laboratory, radiographic, hemodynamic, echocardiographic, respiratory data, microbiologic data and chart were reviewed by myself and analyzed frequently throughout the course of the day and night by myself.    Plan discussed at length with the CTICU staff and Attending CT Surgeon.   Patient's status was discussed with patient at bedside.
MATTHEW CROWELL  MRN-[id number]  _________________________  VITAL SIGNS:  Vital Signs Last 24 Hrs  T(C): 36.9 (08 Nov 2019 04:00), Max: 37.1 (08 Nov 2019 00:00)  T(F): 98.4 (08 Nov 2019 04:00), Max: 98.7 (08 Nov 2019 00:00)  HR: 70 (08 Nov 2019 06:00) (60 - 85)  BP: 147/62 (08 Nov 2019 06:00) (110/86 - 167/76)  BP(mean): 83 (08 Nov 2019 06:00) (68 - 117)  RR: 23 (08 Nov 2019 06:00) (16 - 27)  SpO2: 99% (08 Nov 2019 06:00) (94% - 100%)  I/Os:   I&O's Detail    07 Nov 2019 07:01  -  08 Nov 2019 07:00  --------------------------------------------------------  IN:    lactated ringers.: 630 mL    Oral Fluid: 340 mL  Total IN: 970 mL    OUT:    Chest Tube: 450 mL    Voided: 1250 mL  Total OUT: 1700 mL    Total NET: -730 mL              MEDICATIONS:  MEDICATIONS  (STANDING):  acetaminophen  IVPB .. 1000 milliGRAM(s) IV Intermittent once  aspirin enteric coated 81 milliGRAM(s) Oral daily  atorvastatin 10 milliGRAM(s) Oral at bedtime  carvedilol 12.5 milliGRAM(s) Oral every 12 hours  heparin  Injectable 5000 Unit(s) SubCutaneous every 12 hours  HYDROmorphone PCA (1 mG/mL) 30 milliLiter(s) PCA Continuous PCA Continuous  lactated ringers. 1000 milliLiter(s) (30 mL/Hr) IV Continuous <Continuous>  levothyroxine 75 MICROGram(s) Oral daily  senna 2 Tablet(s) Oral at bedtime  sodium chloride 0.9% lock flush 3 milliLiter(s) IV Push every 8 hours    MEDICATIONS  (PRN):  HYDROmorphone PCA (1 mG/mL) Rescue Clinician Bolus 0.3 milliGRAM(s) IV Push every 15 minutes PRN for Pain Scale GREATER THAN 6  naloxone Injectable 0.1 milliGRAM(s) IV Push every 3 minutes PRN For ANY of the following changes in patient status:  A. RR LESS THAN 10 breaths per minute, B. Oxygen saturation LESS THAN 90%, C. Sedation score of 6  ondansetron Injectable 4 milliGRAM(s) IV Push every 6 hours PRN Nausea      LABS:                        10.2   9.03  )-----------( 168      ( 08 Nov 2019 03:00 )             30.4     11-08    130<L>  |  93<L>  |  20  ----------------------------<  126<H>  4.5   |  24  |  0.80    Ca    8.6      08 Nov 2019 03:00        PT/INR - ( 08 Nov 2019 03:00 )   PT: 12.9 SEC;   INR: 1.13          PTT - ( 08 Nov 2019 03:00 )  PTT:33.3 SEC      _________________________        PROCEDURE: R VATS, RLL wedge   07-Nov-2019      ISSUES:   Lung nodule  Hypothyroidism   HTN (Hypertension)   Mini stroke 4-5 years ago  OA (osteoarthritis)   COPD  Post op pain  chest tube in place      INTERVAL EVENTS:   No overnight events    HISTORY:   Patient reports mild sharp pain at surgical sites which is improved with use of pain meds and worse with deep breathing and movement. There is no associated fever, cough, nausea, or vomiting.    PHYSICAL EXAM:   Gen: Comfortable, No acute distress  Eyes: Sclera white, Conjunctiva normal, Eyelids normal, Pupils symmetrical   ENT: Mucous membranes moist,  ,  ,    Neck: Trachea midline,  ,  ,  ,    CV: Rate regular, Rhythm regular,    Resp: Breath sounds clear, No accessory muscles use, R chest tube in place,  ,    Abd: Soft, Non-distended, Non-tender, Bowel sounds normal,  ,    Skin: Warm, No peripheral edema of lower extremities,    : No hendrickson  Neuro: Moving all 4 extremities,    Psych: A&Ox3      ASSESSMENT AND PLAN:     NEURO:  Post-operative Pain - Pain control with PCA and Tylenol IV PRN.                    RESPIRATORY:  Stable on room air - Incentive spirometry. Chest PT and suctioning of secretions. Out of bed to chair and ambulate with assistance. Continuous pulse oximetry for support & to prevent decompensation.    Chest tube – Pleurevac regulated suctioning. Monitor chest tube output.                    CARDIOVASCULAR:  Hemodynamically stable - Not on pressors. Continue hemodynamic monitoring.  HTN - stable. Hold home antihypertensives for now.  CAD - stable. Continue aspirin.                 RENAL:  Stable – LR IVF 30mL/hr. Monitor IOs and electrolytes.          GASTROINTESTINAL:  GI prophylaxis not indicated  Zofran and Reglan IV PRN for nausea  Regular consistency diet              HEMATOLOGIC:  No signs of active bleeding. Monitor Hgb in CBC in AM  DVT prophylaxis with heparin subQ and SCDs.      INFECTIOUS DISEASE:  All surgical sites appear clean. No signs of active infection. Will monitor for fever and leukocytosis.                        ENDOCRINE:  Stable – Monitor glucose fingersticks for goal 120-180.  Hypothyroidism - stable. Continue Synthroid.         Pertinent clinical, laboratory, radiographic, hemodynamic, echocardiographic, respiratory data, microbiologic data and chart were reviewed by myself and analyzed frequently throughout the course of the day and night by myself.    Plan discussed at length with the CTICU staff and Attending CT Surgeon.   Patient's status was discussed with patient at bedside.
No

## 2020-02-13 ENCOUNTER — FORM ENCOUNTER (OUTPATIENT)
Age: 85
End: 2020-02-13

## 2020-02-14 ENCOUNTER — OUTPATIENT (OUTPATIENT)
Dept: OUTPATIENT SERVICES | Facility: HOSPITAL | Age: 85
LOS: 1 days | End: 2020-02-14
Payer: MEDICARE

## 2020-02-14 ENCOUNTER — APPOINTMENT (OUTPATIENT)
Dept: CT IMAGING | Facility: IMAGING CENTER | Age: 85
End: 2020-02-14
Payer: MEDICARE

## 2020-02-14 ENCOUNTER — APPOINTMENT (OUTPATIENT)
Dept: THORACIC SURGERY | Facility: CLINIC | Age: 85
End: 2020-02-14
Payer: MEDICARE

## 2020-02-14 VITALS
WEIGHT: 126 LBS | RESPIRATION RATE: 16 BRPM | TEMPERATURE: 97.6 F | HEIGHT: 60 IN | BODY MASS INDEX: 24.74 KG/M2 | DIASTOLIC BLOOD PRESSURE: 76 MMHG | OXYGEN SATURATION: 97 % | HEART RATE: 72 BPM | SYSTOLIC BLOOD PRESSURE: 146 MMHG

## 2020-02-14 DIAGNOSIS — Z98.49 CATARACT EXTRACTION STATUS, UNSPECIFIED EYE: Chronic | ICD-10-CM

## 2020-02-14 DIAGNOSIS — C34.91 MALIGNANT NEOPLASM OF UNSPECIFIED PART OF RIGHT BRONCHUS OR LUNG: ICD-10-CM

## 2020-02-14 DIAGNOSIS — Z96.652 PRESENCE OF LEFT ARTIFICIAL KNEE JOINT: Chronic | ICD-10-CM

## 2020-02-14 DIAGNOSIS — Z90.49 ACQUIRED ABSENCE OF OTHER SPECIFIED PARTS OF DIGESTIVE TRACT: Chronic | ICD-10-CM

## 2020-02-14 DIAGNOSIS — Z90.89 ACQUIRED ABSENCE OF OTHER ORGANS: Chronic | ICD-10-CM

## 2020-02-14 PROCEDURE — 71250 CT THORAX DX C-: CPT | Mod: 26

## 2020-02-14 PROCEDURE — 99213 OFFICE O/P EST LOW 20 MIN: CPT

## 2020-02-14 PROCEDURE — 71250 CT THORAX DX C-: CPT

## 2020-02-14 RX ORDER — METHYLPREDNISOLONE 4 MG/1
4 TABLET ORAL
Qty: 21 | Refills: 0 | Status: DISCONTINUED | COMMUNITY
Start: 2018-10-30 | End: 2020-02-14

## 2020-02-14 RX ORDER — TELMISARTAN 80 MG/1
80 TABLET ORAL DAILY
Refills: 0 | Status: ACTIVE | COMMUNITY

## 2020-02-14 RX ORDER — CARVEDILOL 25 MG/1
25 TABLET, FILM COATED ORAL
Refills: 0 | Status: ACTIVE | COMMUNITY

## 2020-02-14 NOTE — ASSESSMENT
[FreeTextEntry1] : 89 year old Female  presenting for a follow up visit. She is status post Right video thoracoscopy with wedge resection of right lower lobe nodule on 11/7/2019.  Pathology revealed adenocarcinoma, acinar predominant with additional lepidic component, T1aNx.\par \par CT scan done on 2/14/2020 revealed patent central airways. Mild emphysema. The patient is status post interval right lower lobe wedge resection with expected postsurgical changes. \par Multiple groundglass nodules within the right apex measuring up to 1 cm are unchanged. A 1.3 cm subsolid right upper lobe nodule  is also unchanged. \par New 0.5 cm right upper lobe nodule. Stable mucoid impaction and mild atelectasis within the right middle lobe and lingula. No pleural effusion. \par \par I have reviewed the patient's medical records and diagnostic images during the time of this office visit, and I have made the following recommendation: \par 1. RTC in a year with non contrast CT. \par \par I personally performed the services described in the documentation, reviewed the documentation recorded by the scribe in my presence and it accurately and completely records my words and actions.\par \par “I Moira Garcia RN am scribing for and the presence of Dr. Clive Leger,  the following sections , History of present illness , Past medical / surgical /  Family /social history ; review of systems; Vital signs; Physical examination and disposition. “\par \par

## 2020-02-14 NOTE — CONSULT LETTER
[Dear  ___] : Dear  [unfilled], [Courtesy Letter:] : I had the pleasure of seeing your patient, [unfilled], in my office today. [Please see my note below.] : Please see my note below. [Sincerely,] : Sincerely, [FreeTextEntry2] : Dr. Terry Cifuentes (Pul)\par Dr. Teto Fry (PCP)\par Dr. Jovita Palma (Cardiologist) \par  [FreeTextEntry3] : Clive Leger MD, FACS \par , Division of Thoracic Surgery \par [facility] \par Chief, Thoracic Surgery \par [facility] \par Department of Cardiovascular & Thoracic Surgery \par  \par [facility] [employer] [facility]\par \par

## 2020-02-14 NOTE — HISTORY OF PRESENT ILLNESS
[FreeTextEntry1] : MATTHEW CROWELL is a 89 year old Female  presenting for a follow up visit. She is status post Right video thoracoscopy with wedge resection of right lower lobe nodule on 11/7/2019.  Pathology revealed adenocarcinoma, acinar predominant with additional lepidic component, T1aNx. She is a former smoker with a history of HTN, stroke and chronic lung nodules, followed by pulmonology, Dr. Cifuentes.\par \par CT scan done on 2/14/2020 revealed patent central airways. Mild emphysema. The patient is status post interval right lower lobe wedge resection with expected postsurgical changes. \par Multiple groundglass nodules within the right apex measuring up to 1 cm are unchanged. A 1.3 cm subsolid right upper lobe nodule  is also unchanged. \par New 0.5 cm right upper lobe nodule. Stable mucoid impaction and mild atelectasis within the right middle lobe and lingula. No pleural effusion. \par \par She has very occasional  dry cough. She denies chest pain, shortness of breath, hemoptysis, fever, palpitations, syncope, URI or recent illness.\par \par \par

## 2020-05-18 ENCOUNTER — APPOINTMENT (OUTPATIENT)
Dept: OPHTHALMOLOGY | Facility: CLINIC | Age: 85
End: 2020-05-18

## 2020-05-19 ENCOUNTER — APPOINTMENT (OUTPATIENT)
Dept: PULMONOLOGY | Facility: CLINIC | Age: 85
End: 2020-05-19
Payer: MEDICARE

## 2020-05-21 ENCOUNTER — APPOINTMENT (OUTPATIENT)
Dept: PULMONOLOGY | Facility: CLINIC | Age: 85
End: 2020-05-21
Payer: MEDICARE

## 2020-05-21 VITALS
TEMPERATURE: 98.7 F | HEART RATE: 80 BPM | DIASTOLIC BLOOD PRESSURE: 74 MMHG | WEIGHT: 128 LBS | SYSTOLIC BLOOD PRESSURE: 146 MMHG | OXYGEN SATURATION: 97 % | BODY MASS INDEX: 25 KG/M2

## 2020-05-21 PROCEDURE — 99214 OFFICE O/P EST MOD 30 MIN: CPT

## 2020-05-25 NOTE — HISTORY OF PRESENT ILLNESS
[TextBox_4] : 89 yo female with hx of mild OAD and early stage adenocarcinoma of the lung, presents for follow up. The patient complains of PRN productive cough without fever or chest pain. She had one episode of streaky hemoptysis a few days ago, without recurrence. A chest CT was performed a few months ago, ordered by CT surgery.

## 2020-05-25 NOTE — DISCUSSION/SUMMARY
[FreeTextEntry1] : 89 yo female with stable pulmonary exam. She is to use albuterol MDI PRN. Treatment adjustment will depend on symptomatic needs. If hemoptysis recurs, she is to call. Follow up with CT surgery and her PMD as before.

## 2020-05-25 NOTE — REVIEW OF SYSTEMS
[Cough] : cough [Hemoptysis] : hemoptysis [Sputum] : sputum [Wheezing] : no wheezing [SOB on Exertion] : no sob on exertion [Negative] : Endocrine

## 2020-08-14 ENCOUNTER — APPOINTMENT (OUTPATIENT)
Dept: CT IMAGING | Facility: IMAGING CENTER | Age: 85
End: 2020-08-14
Payer: MEDICARE

## 2020-08-14 ENCOUNTER — OUTPATIENT (OUTPATIENT)
Dept: OUTPATIENT SERVICES | Facility: HOSPITAL | Age: 85
LOS: 1 days | End: 2020-08-14
Payer: MEDICARE

## 2020-08-14 DIAGNOSIS — Z00.8 ENCOUNTER FOR OTHER GENERAL EXAMINATION: ICD-10-CM

## 2020-08-14 DIAGNOSIS — Z90.89 ACQUIRED ABSENCE OF OTHER ORGANS: Chronic | ICD-10-CM

## 2020-08-14 DIAGNOSIS — Z90.49 ACQUIRED ABSENCE OF OTHER SPECIFIED PARTS OF DIGESTIVE TRACT: Chronic | ICD-10-CM

## 2020-08-14 DIAGNOSIS — Z98.49 CATARACT EXTRACTION STATUS, UNSPECIFIED EYE: Chronic | ICD-10-CM

## 2020-08-14 DIAGNOSIS — Z96.652 PRESENCE OF LEFT ARTIFICIAL KNEE JOINT: Chronic | ICD-10-CM

## 2020-08-14 PROCEDURE — 74176 CT ABD & PELVIS W/O CONTRAST: CPT

## 2020-08-14 PROCEDURE — 74176 CT ABD & PELVIS W/O CONTRAST: CPT | Mod: 26

## 2020-09-17 ENCOUNTER — APPOINTMENT (OUTPATIENT)
Dept: PULMONOLOGY | Facility: CLINIC | Age: 85
End: 2020-09-17
Payer: MEDICARE

## 2020-09-17 VITALS
WEIGHT: 130 LBS | BODY MASS INDEX: 25.39 KG/M2 | DIASTOLIC BLOOD PRESSURE: 74 MMHG | HEART RATE: 72 BPM | OXYGEN SATURATION: 97 % | TEMPERATURE: 98.7 F | SYSTOLIC BLOOD PRESSURE: 128 MMHG

## 2020-09-17 DIAGNOSIS — J40 BRONCHITIS, NOT SPECIFIED AS ACUTE OR CHRONIC: ICD-10-CM

## 2020-09-17 DIAGNOSIS — Z23 ENCOUNTER FOR IMMUNIZATION: ICD-10-CM

## 2020-09-17 PROCEDURE — 99214 OFFICE O/P EST MOD 30 MIN: CPT | Mod: 25

## 2020-09-17 PROCEDURE — G0008: CPT

## 2020-09-17 PROCEDURE — 90662 IIV NO PRSV INCREASED AG IM: CPT

## 2020-09-20 PROBLEM — J40 BRONCHITIS: Status: ACTIVE | Noted: 2018-03-30

## 2020-09-20 PROBLEM — Z23 INFLUENZA VACCINATION ADMINISTERED AT CURRENT VISIT: Status: ACTIVE | Noted: 2020-09-20

## 2020-09-20 NOTE — REVIEW OF SYSTEMS
[Cough] : cough [Hemoptysis] : no hemoptysis [Sputum] : no sputum [Wheezing] : no wheezing [SOB on Exertion] : no sob on exertion [Negative] : Endocrine

## 2020-09-20 NOTE — DISCUSSION/SUMMARY
[FreeTextEntry1] : 89 yo female with recent increase in OAD related complaints, which have improved. PRN albuterol MDI for now. Treatment adjustment will depend on symptomatic needs.The influenza vaccine was administered. She is requesting a new PCP; referred to Dr TOR Escamilla.

## 2020-09-20 NOTE — HISTORY OF PRESENT ILLNESS
[Former] : former [>= 30 pack years] : >= 30 pack years [TextBox_4] : 89 yo female with hx of mild OAD and early stage lung adenocarcinoma, presents complaining of recent dry cough. She denies fever, chest pain or hemoptysis. She had contacted her PMD, who recommended antibiotics, which she did not take. Presently she feels "better" with rare albuterol MDI use. [TextBox_19] : Denies snoring, daytime somnolence, apneic episodes, AM headaches

## 2020-09-21 ENCOUNTER — MED ADMIN CHARGE (OUTPATIENT)
Age: 85
End: 2020-09-21

## 2020-11-05 ENCOUNTER — OUTPATIENT (OUTPATIENT)
Dept: OUTPATIENT SERVICES | Facility: HOSPITAL | Age: 85
LOS: 1 days | End: 2020-11-05
Payer: MEDICARE

## 2020-11-05 ENCOUNTER — APPOINTMENT (OUTPATIENT)
Dept: MAMMOGRAPHY | Facility: IMAGING CENTER | Age: 85
End: 2020-11-05
Payer: MEDICARE

## 2020-11-05 ENCOUNTER — APPOINTMENT (OUTPATIENT)
Dept: ULTRASOUND IMAGING | Facility: IMAGING CENTER | Age: 85
End: 2020-11-05
Payer: MEDICARE

## 2020-11-05 DIAGNOSIS — Z98.49 CATARACT EXTRACTION STATUS, UNSPECIFIED EYE: Chronic | ICD-10-CM

## 2020-11-05 DIAGNOSIS — Z00.8 ENCOUNTER FOR OTHER GENERAL EXAMINATION: ICD-10-CM

## 2020-11-05 DIAGNOSIS — Z90.89 ACQUIRED ABSENCE OF OTHER ORGANS: Chronic | ICD-10-CM

## 2020-11-05 DIAGNOSIS — Z90.49 ACQUIRED ABSENCE OF OTHER SPECIFIED PARTS OF DIGESTIVE TRACT: Chronic | ICD-10-CM

## 2020-11-05 DIAGNOSIS — Z96.652 PRESENCE OF LEFT ARTIFICIAL KNEE JOINT: Chronic | ICD-10-CM

## 2020-11-05 PROCEDURE — 77066 DX MAMMO INCL CAD BI: CPT | Mod: 26

## 2020-11-05 PROCEDURE — 77066 DX MAMMO INCL CAD BI: CPT

## 2020-11-05 PROCEDURE — G0279: CPT | Mod: 26

## 2020-11-05 PROCEDURE — G0279: CPT

## 2021-01-04 ENCOUNTER — APPOINTMENT (OUTPATIENT)
Dept: OPHTHALMOLOGY | Facility: CLINIC | Age: 86
End: 2021-01-04
Payer: MEDICARE

## 2021-01-04 ENCOUNTER — NON-APPOINTMENT (OUTPATIENT)
Age: 86
End: 2021-01-04

## 2021-01-04 PROCEDURE — 92014 COMPRE OPH EXAM EST PT 1/>: CPT

## 2021-02-12 ENCOUNTER — OUTPATIENT (OUTPATIENT)
Dept: OUTPATIENT SERVICES | Facility: HOSPITAL | Age: 86
LOS: 1 days | End: 2021-02-12
Payer: MEDICARE

## 2021-02-12 ENCOUNTER — APPOINTMENT (OUTPATIENT)
Dept: CT IMAGING | Facility: IMAGING CENTER | Age: 86
End: 2021-02-12
Payer: MEDICARE

## 2021-02-12 ENCOUNTER — RESULT REVIEW (OUTPATIENT)
Age: 86
End: 2021-02-12

## 2021-02-12 DIAGNOSIS — C34.91 MALIGNANT NEOPLASM OF UNSPECIFIED PART OF RIGHT BRONCHUS OR LUNG: ICD-10-CM

## 2021-02-12 DIAGNOSIS — Z90.89 ACQUIRED ABSENCE OF OTHER ORGANS: Chronic | ICD-10-CM

## 2021-02-12 DIAGNOSIS — Z90.49 ACQUIRED ABSENCE OF OTHER SPECIFIED PARTS OF DIGESTIVE TRACT: Chronic | ICD-10-CM

## 2021-02-12 DIAGNOSIS — Z98.49 CATARACT EXTRACTION STATUS, UNSPECIFIED EYE: Chronic | ICD-10-CM

## 2021-02-12 DIAGNOSIS — Z96.652 PRESENCE OF LEFT ARTIFICIAL KNEE JOINT: Chronic | ICD-10-CM

## 2021-02-12 PROCEDURE — G1004: CPT

## 2021-02-12 PROCEDURE — 71250 CT THORAX DX C-: CPT

## 2021-02-12 PROCEDURE — 71250 CT THORAX DX C-: CPT | Mod: 26,ME

## 2021-02-17 ENCOUNTER — APPOINTMENT (OUTPATIENT)
Dept: THORACIC SURGERY | Facility: CLINIC | Age: 86
End: 2021-02-17
Payer: MEDICARE

## 2021-02-17 VITALS
WEIGHT: 129 LBS | HEART RATE: 68 BPM | HEIGHT: 60 IN | DIASTOLIC BLOOD PRESSURE: 82 MMHG | SYSTOLIC BLOOD PRESSURE: 169 MMHG | TEMPERATURE: 98.3 F | BODY MASS INDEX: 25.32 KG/M2 | OXYGEN SATURATION: 98 %

## 2021-02-17 VITALS
DIASTOLIC BLOOD PRESSURE: 81 MMHG | HEIGHT: 60 IN | HEART RATE: 76 BPM | SYSTOLIC BLOOD PRESSURE: 157 MMHG | BODY MASS INDEX: 27.48 KG/M2 | WEIGHT: 140 LBS | OXYGEN SATURATION: 97 % | TEMPERATURE: 97.9 F

## 2021-02-17 PROCEDURE — 99213 OFFICE O/P EST LOW 20 MIN: CPT

## 2021-02-18 NOTE — PHYSICAL EXAM
[Sclera] : the sclera and conjunctiva were normal [PERRL With Normal Accommodation] : pupils were equal in size, round, and reactive to light [Extraocular Movements] : extraocular movements were intact [Neck Appearance] : the appearance of the neck was normal [Respiration, Rhythm And Depth] : normal respiratory rhythm and effort [Exaggerated Use Of Accessory Muscles For Inspiration] : no accessory muscle use [Auscultation Breath Sounds / Voice Sounds] : lungs were clear to auscultation bilaterally [Heart Rate And Rhythm] : heart rate was normal and rhythm regular [Examination Of The Chest] : the chest was normal in appearance [Chest Visual Inspection Thoracic Asymmetry] : no chest asymmetry [Diminished Respiratory Excursion] : normal chest expansion [Breast Appearance] : normal in appearance [Bowel Sounds] : normal bowel sounds [Breast Palpation Mass] : no palpable masses [Abdomen Soft] : soft [Abdomen Tenderness] : non-tender [Cervical Lymph Nodes Enlarged Posterior Bilaterally] : posterior cervical [Supraclavicular Lymph Nodes Enlarged Bilaterally] : supraclavicular [Cervical Lymph Nodes Enlarged Anterior Bilaterally] : anterior cervical [No CVA Tenderness] : no ~M costovertebral angle tenderness [No Spinal Tenderness] : no spinal tenderness [Abnormal Walk] : normal gait [Nail Clubbing] : no clubbing  or cyanosis of the fingernails [Involuntary Movements] : no involuntary movements were seen [Motor Tone] : muscle strength and tone were normal [Musculoskeletal - Swelling] : no joint swelling seen [Skin Color & Pigmentation] : normal skin color and pigmentation [Skin Turgor] : normal skin turgor [Skin Lesions] : no skin lesions [] : no rash [Deep Tendon Reflexes (DTR)] : deep tendon reflexes were 2+ and symmetric [Sensation] : the sensory exam was normal to light touch and pinprick [Motor Exam] : the motor exam was normal [No Focal Deficits] : no focal deficits [Affect] : the affect was normal [Impaired Insight] : insight and judgment were intact [Mood] : the mood was normal [Memory Remote] : remote memory was not impaired [Memory Recent] : recent memory was not impaired

## 2021-02-23 NOTE — HISTORY OF PRESENT ILLNESS
[FreeTextEntry1] : MATTHEW CROWELL is a 89 year old Female presenting for a follow up visit. She is status post Right video thoracoscopy with wedge resection of right lower lobe nodule on 11/7/2019. Pathology revealed adenocarcinoma, acinar predominant with additional lepidic component, T1aNx. She is a former smoker with a history of HTN, stroke and chronic lung nodules, followed by pulmonology, Dr. Cifuentes.\par \par CT scan done on 2/14/2020 revealed patent central airways. Mild emphysema. The patient is status post interval right lower lobe wedge resection with expected postsurgical changes. \par Multiple groundglass nodules within the right apex measuring up to 1 cm are unchanged. A 1.3 cm subsolid right upper lobe nodule is also unchanged.New 0.5 cm right upper lobe nodule. Stable mucoid impaction and mild atelectasis within the right middle lobe and lingula. No pleural effusion. \par \par CT Chest w/o contrast on 2/12/2021 revealed stable mucoid impaction and atelectasis within the RML and lingula. Unchanged 1.3cm subsolid nodule within the posterior RUL (2:33). Unchanged gg nodules within the RUL measuring up to 1.2 cm. No pleural effusion. \par \par \par Patient presents today for follow up. Patient denies worsening SOB, chest pain, cough, hemoptysis, fever, chills, night sweats, lightheadedness or dizziness.\par

## 2021-02-23 NOTE — DATA REVIEWED
[FreeTextEntry1] : CT Chest w/o contrast on 2/12/2021 revealed stable mucoid impaction and atelectasis within the RML and lingula. Unchanged 1.3cm subsolid nodule within the posterior RUL (2:33). Unchanged gg nodules within the RUL measuring up to 1.2 cm. No pleural effusion. \par

## 2021-02-23 NOTE — CONSULT LETTER
[Dear  ___] : Dear  [unfilled], [Consult Letter:] : I had the pleasure of evaluating your patient, [unfilled]. [( Thank you for referring [unfilled] for consultation for _____ )] : Thank you for referring [unfilled] for consultation for [unfilled] [Please see my note below.] : Please see my note below. [Consult Closing:] : Thank you very much for allowing me to participate in the care of this patient.  If you have any questions, please do not hesitate to contact me. [Sincerely,] : Sincerely, [FreeTextEntry2] : Dr. Terry Cifuentes (Pul)\par Dr. Teto Fry (PCP)\par Dr. Jovita Palma (Cardiologist)  [FreeTextEntry3] : Clive Leger MD, FACS \par , Division of Thoracic Surgery \par [facility] \par Chief, Thoracic Surgery \par [facility] \par Department of Cardiovascular & Thoracic Surgery \par  \par [facility] [employer] [facility]\par

## 2021-02-23 NOTE — ASSESSMENT
[FreeTextEntry1] : MATTHEW CROWELL is a 89 year old Female presenting for a follow up visit. She is status post Right video thoracoscopy with wedge resection of right lower lobe nodule on 11/7/2019. Pathology revealed adenocarcinoma, acinar predominant with additional lepidic component, T1aNx. She is a former smoker with a history of HTN, stroke and chronic lung nodules, followed by pulmonology, Dr. Cifuentes.  \par \par CT Chest w/o contrast on 2/12/2021 revealed stable mucoid impaction and atelectasis within the RML and lingula. Unchanged 1.3cm subsolid nodule within the posterior RUL (2:33). Unchanged gg nodules within the RUL measuring up to 1.2 cm. No pleural effusion. \par \par I have reviewed the patient's medical records and diagnostic images at time of this office consultation. CT chest stable ;  I recommend f/u one year CT chest. Recommendations reviewed with patient during this office visit, and all questions answered; Patient instructed on the importance of follow up and verbalizes understanding.\par \par I personally performed the services described in the documentation, reviewed the documentation recorded by the scribe in my presence and it accurately and completely records my words and actions.\par \par I, ARIES Antony-C, am scribing for and the presence of CLAUDINE Marie, the following sections HISTORY OF PRESENT ILLNESS, PAST MEDICAL/FAMILY/SOCIAL HISTORY; REVIEW OF SYSTEMS; VITAL SIGNS; PHYSICAL EXAM; DISPOSITION.\par \par

## 2021-03-23 ENCOUNTER — APPOINTMENT (OUTPATIENT)
Dept: PULMONOLOGY | Facility: CLINIC | Age: 86
End: 2021-03-23
Payer: MEDICARE

## 2021-03-23 VITALS
OXYGEN SATURATION: 98 % | WEIGHT: 135 LBS | DIASTOLIC BLOOD PRESSURE: 62 MMHG | TEMPERATURE: 98 F | BODY MASS INDEX: 26.37 KG/M2 | HEART RATE: 78 BPM | SYSTOLIC BLOOD PRESSURE: 132 MMHG

## 2021-03-23 DIAGNOSIS — J21.9 ACUTE BRONCHIOLITIS, UNSPECIFIED: ICD-10-CM

## 2021-03-23 PROCEDURE — 99212 OFFICE O/P EST SF 10 MIN: CPT

## 2021-03-28 PROBLEM — J21.9 ACUTE BRONCHIOLITIS WITH BRONCHOSPASM: Status: ACTIVE | Noted: 2021-03-28

## 2021-03-28 NOTE — REVIEW OF SYSTEMS
[Cough] : cough [Hemoptysis] : no hemoptysis [Sputum] : sputum [Wheezing] : no wheezing [SOB on Exertion] : no sob on exertion [Negative] : Endocrine

## 2021-03-28 NOTE — DISCUSSION/SUMMARY
[FreeTextEntry1] : 89 yo female with acute bronchitis. Prednisone 40 mg daily for 5 days  and zpack prescribed. PRN albuterol MDI use as before recommended. She is to follow up with CT surgery and her PMD as before.

## 2021-03-28 NOTE — HISTORY OF PRESENT ILLNESS
[Former] : former [>= 30 pack years] : >= 30 pack years [TextBox_4] : 89 yo female presents for evaluation of PRN productive cough for one week. She denies fever, chest pain or hemoptysis. The patient is         1 1/2 year post VATS of right lung nodule. Follow up chest CT performed last month was stable. [TextBox_29] : Denies snoring, daytime somnolence, apneic episodes, AM headaches

## 2021-04-20 ENCOUNTER — APPOINTMENT (OUTPATIENT)
Dept: PULMONOLOGY | Facility: CLINIC | Age: 86
End: 2021-04-20
Payer: MEDICARE

## 2021-04-20 VITALS
SYSTOLIC BLOOD PRESSURE: 115 MMHG | TEMPERATURE: 97 F | DIASTOLIC BLOOD PRESSURE: 80 MMHG | RESPIRATION RATE: 16 BRPM | OXYGEN SATURATION: 97 % | HEART RATE: 73 BPM | WEIGHT: 133 LBS | BODY MASS INDEX: 25.98 KG/M2

## 2021-04-20 PROCEDURE — 99212 OFFICE O/P EST SF 10 MIN: CPT

## 2021-05-09 NOTE — DISCUSSION/SUMMARY
[FreeTextEntry1] : 89 yo female with hx of mild OAD with related complaints. She was given a sample of breztri for short term use with continued PRN use of albuterol MDI. Treatment adjustment will depend on symptomatic needs. PFT will be repeated in the future. She is to follow up with her PMD as before.

## 2021-05-09 NOTE — HISTORY OF PRESENT ILLNESS
[Former] : former [< 30 pack-years] : < 30 pack-years [TextBox_11] : 1 [TextBox_4] : 89 yo female presents complaining of productive cough for four days. She denies chest pain, fever or hemoptysis. [TextBox_13] : 28 [YearQuit] : 1990 [TextBox_29] : Denies snoring, daytime somnolence, apneic episodes, AM headaches

## 2021-05-09 NOTE — PHYSICAL EXAM
[No Acute Distress] : no acute distress [Normal Oropharynx] : normal oropharynx [Normal Appearance] : normal appearance [No Neck Mass] : no neck mass [Normal Rate/Rhythm] : normal rate/rhythm [Normal S1, S2] : normal s1, s2 [No Murmurs] : no murmurs [No Resp Distress] : no resp distress [Clear to Auscultation Bilaterally] : clear to auscultation bilaterally [No Abnormalities] : no abnormalities [Benign] : benign [Normal Gait] : normal gait [No Cyanosis] : no cyanosis [No Clubbing] : no clubbing [No Edema] : no edema [FROM] : FROM [Normal Color/ Pigmentation] : normal color/ pigmentation [No Focal Deficits] : no focal deficits [Oriented x3] : oriented x3 [Normal Affect] : normal affect

## 2021-09-14 ENCOUNTER — APPOINTMENT (OUTPATIENT)
Dept: PULMONOLOGY | Facility: CLINIC | Age: 86
End: 2021-09-14
Payer: MEDICARE

## 2021-09-14 VITALS
TEMPERATURE: 97.3 F | HEART RATE: 71 BPM | BODY MASS INDEX: 25.52 KG/M2 | WEIGHT: 130 LBS | OXYGEN SATURATION: 96 % | HEIGHT: 60 IN

## 2021-09-14 PROCEDURE — 99212 OFFICE O/P EST SF 10 MIN: CPT

## 2021-09-26 NOTE — HISTORY OF PRESENT ILLNESS
[TextBox_4] : 89 yo female presents complaining of two week history of productive cough without fever, chest pain or hemoptysis.

## 2021-09-26 NOTE — PHYSICAL EXAM
[No Acute Distress] : no acute distress [Normal Oropharynx] : normal oropharynx [No Neck Mass] : no neck mass [Normal Appearance] : normal appearance [Normal Rate/Rhythm] : normal rate/rhythm [Normal S1, S2] : normal s1, s2 [No Murmurs] : no murmurs [No Resp Distress] : no resp distress [Clear to Auscultation Bilaterally] : clear to auscultation bilaterally [No Abnormalities] : no abnormalities [Benign] : benign [No Clubbing] : no clubbing [Normal Gait] : normal gait [No Cyanosis] : no cyanosis [No Edema] : no edema [FROM] : FROM [Normal Color/ Pigmentation] : normal color/ pigmentation [No Focal Deficits] : no focal deficits [Normal Affect] : normal affect [Oriented x3] : oriented x3

## 2021-09-26 NOTE — DISCUSSION/SUMMARY
[FreeTextEntry1] : 89 yo female with with acute bronchitis. Leeanne prescribed. She was given a two week supply of    trelegy 100. Treatment adjustment will depend on symptomatic needs. Follow up with her PMD for the influenza vaccine.

## 2021-09-27 NOTE — H&P PST ADULT - FUNCTIONAL LEVEL PRIOR: COMMUNICATION
meds verified with ins fill hx and [facility]  of note per [facility]:   patient filled Methadone 10mg 1 tab every 6 hours -last dispensed on 8/19/2021 for a 30 day supply  and Gabapentin 300mg  - 1 tab every 8 hours - last dispensed on 8/10/2021 for a 30 day supply 0 = understands/communicates without difficulty

## 2021-10-05 ENCOUNTER — APPOINTMENT (OUTPATIENT)
Dept: OPHTHALMOLOGY | Facility: CLINIC | Age: 86
End: 2021-10-05
Payer: MEDICARE

## 2021-10-05 ENCOUNTER — NON-APPOINTMENT (OUTPATIENT)
Age: 86
End: 2021-10-05

## 2021-10-05 PROCEDURE — 92012 INTRM OPH EXAM EST PATIENT: CPT

## 2021-10-12 RX ORDER — ALBUTEROL SULFATE 90 UG/1
108 (90 BASE) INHALANT RESPIRATORY (INHALATION)
Qty: 1 | Refills: 3 | Status: ACTIVE | COMMUNITY
Start: 2021-04-20 | End: 1900-01-01

## 2022-01-17 ENCOUNTER — APPOINTMENT (OUTPATIENT)
Dept: CT IMAGING | Facility: IMAGING CENTER | Age: 87
End: 2022-01-17
Payer: MEDICARE

## 2022-01-17 ENCOUNTER — OUTPATIENT (OUTPATIENT)
Dept: OUTPATIENT SERVICES | Facility: HOSPITAL | Age: 87
LOS: 1 days | End: 2022-01-17
Payer: MEDICARE

## 2022-01-17 DIAGNOSIS — C34.91 MALIGNANT NEOPLASM OF UNSPECIFIED PART OF RIGHT BRONCHUS OR LUNG: ICD-10-CM

## 2022-01-17 DIAGNOSIS — Z90.89 ACQUIRED ABSENCE OF OTHER ORGANS: Chronic | ICD-10-CM

## 2022-01-17 DIAGNOSIS — Z96.652 PRESENCE OF LEFT ARTIFICIAL KNEE JOINT: Chronic | ICD-10-CM

## 2022-01-17 DIAGNOSIS — Z98.49 CATARACT EXTRACTION STATUS, UNSPECIFIED EYE: Chronic | ICD-10-CM

## 2022-01-17 DIAGNOSIS — Z90.49 ACQUIRED ABSENCE OF OTHER SPECIFIED PARTS OF DIGESTIVE TRACT: Chronic | ICD-10-CM

## 2022-01-17 PROCEDURE — G1004: CPT

## 2022-01-17 PROCEDURE — 71250 CT THORAX DX C-: CPT | Mod: ME

## 2022-01-17 PROCEDURE — 71250 CT THORAX DX C-: CPT | Mod: 26,ME

## 2022-01-19 ENCOUNTER — APPOINTMENT (OUTPATIENT)
Dept: THORACIC SURGERY | Facility: CLINIC | Age: 87
End: 2022-01-19
Payer: MEDICARE

## 2022-01-19 VITALS
HEIGHT: 60 IN | OXYGEN SATURATION: 97 % | BODY MASS INDEX: 25.52 KG/M2 | RESPIRATION RATE: 17 BRPM | SYSTOLIC BLOOD PRESSURE: 152 MMHG | HEART RATE: 79 BPM | WEIGHT: 130 LBS | DIASTOLIC BLOOD PRESSURE: 79 MMHG

## 2022-01-19 PROCEDURE — 99213 OFFICE O/P EST LOW 20 MIN: CPT

## 2022-01-19 NOTE — PHYSICAL EXAM
[Respiration, Rhythm And Depth] : normal respiratory rhythm and effort [Exaggerated Use Of Accessory Muscles For Inspiration] : no accessory muscle use [Auscultation Breath Sounds / Voice Sounds] : lungs were clear to auscultation bilaterally [Examination Of The Chest] : the chest was normal in appearance [Chest Visual Inspection Thoracic Asymmetry] : no chest asymmetry [Diminished Respiratory Excursion] : normal chest expansion [Cervical Lymph Nodes Enlarged Posterior Bilaterally] : posterior cervical [Cervical Lymph Nodes Enlarged Anterior Bilaterally] : anterior cervical [Supraclavicular Lymph Nodes Enlarged Bilaterally] : supraclavicular [Abnormal Walk] : normal gait [Skin Color & Pigmentation] : normal skin color and pigmentation [Skin Turgor] : normal skin turgor [] : no rash [Oriented To Time, Place, And Person] : oriented to person, place, and time

## 2022-01-21 NOTE — ASSESSMENT
[FreeTextEntry1] : MATTHEW CROWELL is a 89 year old Female presenting for a follow up visit. She is status post Right video thoracoscopy with wedge resection of right lower lobe nodule on 11/7/2019. Pathology revealed adenocarcinoma, acinar predominant with additional lepidic component, T1aNx. She is a former smoker with a history of HTN, stroke and chronic lung nodules, followed by pulmonology, Dr. Cifuentes.  \par \par I have independently reviewed the medical records and imaging at the time of this office consultation. Recent CT chest independently reviewed and demonstrating multiple, unchanged lung nodules. Will await final read to confirm. Recommendation to return to clinic in 12 months with repeat CT Chest, no contrast, to re-evaluate stability. \par \par Recommendations reviewed with patient during this office visit, and all questions answered; Patient instructed on the importance of follow up and verbalizes understanding.\par \par I personally performed the services described in the documentation, reviewed the documentation recorded by the scribe in my presence and it accurately and completely records my words and actions.\par \par I, ARIES Antony-C, am scribing for and the presence of CLAUDINE Marie, the following sections HISTORY OF PRESENT ILLNESS, PAST MEDICAL/FAMILY/SOCIAL HISTORY; REVIEW OF SYSTEMS; VITAL SIGNS; PHYSICAL EXAM; DISPOSITION.\par \par \par \par

## 2022-01-21 NOTE — HISTORY OF PRESENT ILLNESS
[FreeTextEntry1] : MATTHEW CROWELL is a 89 year old Female presenting for a follow up visit. She is status post Right video thoracoscopy with wedge resection of right lower lobe nodule on 11/7/2019. Pathology revealed adenocarcinoma, acinar predominant with additional lepidic component, T1aNx. She is a former smoker with a history of HTN, stroke and chronic lung nodules, followed by pulmonology, Dr. Cifuentes.\par \par CT scan done on 2/14/2020 revealed patent central airways. Mild emphysema. The patient is status post interval right lower lobe wedge resection with expected postsurgical changes. \par Multiple groundglass nodules within the right apex measuring up to 1 cm are unchanged. A 1.3 cm subsolid right upper lobe nodule is also unchanged.New 0.5 cm right upper lobe nodule. Stable mucoid impaction and mild atelectasis within the right middle lobe and lingula. No pleural effusion. \par \par CT Chest w/o contrast on 2/12/2021 revealed stable mucoid impaction and atelectasis within the RML and lingula. Unchanged 1.3cm subsolid nodule within the posterior RUL (2:33). Unchanged gg nodules within the RUL measuring up to 1.2 cm. No pleural effusion. \par \par CT Chest on 1/17/2022 (Awaiting final read)\par - Multiple lung nodules, unchanged when compared to CT chest on 2/12/21\par \par Patient presents today for follow up. Patient denies worsening SOB, chest pain, cough, hemoptysis, fever, chills, night sweats, lightheadedness or dizziness.\par

## 2022-01-21 NOTE — CONSULT LETTER
[Dear  ___] : Dear  [unfilled], [Courtesy Letter:] : I had the pleasure of seeing your patient, [unfilled], in my office today. [Please see my note below.] : Please see my note below. [Sincerely,] : Sincerely, [FreeTextEntry2] : Dr. Terry Cifuentes (Pul)\par Dr. Teto Fry (PCP)\par Dr. Jovita Palma (Cardiologist)  [FreeTextEntry3] : Clive Leger MD, FACS \par , Division of Thoracic Surgery \par [facility] \par Chief, Thoracic Surgery \par [facility] \par Department of Cardiovascular & Thoracic Surgery \par  \par [facility] [employer] [facility]\par

## 2022-03-30 ENCOUNTER — NON-APPOINTMENT (OUTPATIENT)
Age: 87
End: 2022-03-30

## 2022-03-30 ENCOUNTER — APPOINTMENT (OUTPATIENT)
Dept: OPHTHALMOLOGY | Facility: CLINIC | Age: 87
End: 2022-03-30
Payer: MEDICARE

## 2022-03-30 PROCEDURE — 92014 COMPRE OPH EXAM EST PT 1/>: CPT

## 2022-03-30 PROCEDURE — 92250 FUNDUS PHOTOGRAPHY W/I&R: CPT

## 2022-05-06 ENCOUNTER — APPOINTMENT (OUTPATIENT)
Dept: PULMONOLOGY | Facility: CLINIC | Age: 87
End: 2022-05-06
Payer: MEDICARE

## 2022-05-06 VITALS
OXYGEN SATURATION: 92 % | DIASTOLIC BLOOD PRESSURE: 70 MMHG | HEART RATE: 73 BPM | TEMPERATURE: 96.8 F | BODY MASS INDEX: 24.22 KG/M2 | SYSTOLIC BLOOD PRESSURE: 134 MMHG | WEIGHT: 124 LBS

## 2022-05-06 DIAGNOSIS — U09.9 POST COVID-19 CONDITION, UNSPECIFIED: ICD-10-CM

## 2022-05-06 PROCEDURE — 99214 OFFICE O/P EST MOD 30 MIN: CPT

## 2022-05-07 PROBLEM — U09.9 POST COVID-19 CONDITION, UNSPECIFIED: Status: ACTIVE | Noted: 2022-05-07

## 2022-05-07 NOTE — DISCUSSION/SUMMARY
[FreeTextEntry1] : 89 yo female with post infectious/ inflammatory cough. She was given a two week sample of trelegy 200 with PRN albuterol MDI use. Treatment adjustment will depend on symptomatic needs. PFT will be performed in the future.

## 2022-05-07 NOTE — HISTORY OF PRESENT ILLNESS
[Former] : former [>= 20 pack years] : >= 20 pack years [TextBox_4] : 89 yo female post covid 19 infection last month, presents complaining of productive cough.She denies fever, chest pain or hemoptysis. The patient was treated in a walk in clinic with cough suppressant and PRN albuterol.  [YearQuit] : 1970 [TextBox_29] : Denies snoring, daytime somnolence, apneic episodes, AM headaches

## 2022-06-29 ENCOUNTER — APPOINTMENT (OUTPATIENT)
Dept: OPHTHALMOLOGY | Facility: CLINIC | Age: 87
End: 2022-06-29

## 2022-06-29 ENCOUNTER — NON-APPOINTMENT (OUTPATIENT)
Age: 87
End: 2022-06-29

## 2022-06-29 PROCEDURE — 92012 INTRM OPH EXAM EST PATIENT: CPT

## 2022-08-15 DIAGNOSIS — M20.42 OTHER HAMMER TOE(S) (ACQUIRED), LEFT FOOT: ICD-10-CM

## 2022-08-15 DIAGNOSIS — L02.612 CUTANEOUS ABSCESS OF LEFT FOOT: ICD-10-CM

## 2022-08-15 DIAGNOSIS — Z98.890 OTHER SPECIFIED POSTPROCEDURAL STATES: ICD-10-CM

## 2022-08-15 DIAGNOSIS — M19.071 PRIMARY OSTEOARTHRITIS, RIGHT ANKLE AND FOOT: ICD-10-CM

## 2022-08-15 DIAGNOSIS — Z86.39 PERSONAL HISTORY OF OTHER ENDOCRINE, NUTRITIONAL AND METABOLIC DISEASE: ICD-10-CM

## 2022-08-15 DIAGNOSIS — B35.1 TINEA UNGUIUM: ICD-10-CM

## 2022-08-15 DIAGNOSIS — Z86.79 PERSONAL HISTORY OF OTHER DISEASES OF THE CIRCULATORY SYSTEM: ICD-10-CM

## 2022-08-15 DIAGNOSIS — L85.1 ACQUIRED KERATOSIS [KERATODERMA] PALMARIS ET PLANTARIS: ICD-10-CM

## 2022-08-15 DIAGNOSIS — Z87.898 PERSONAL HISTORY OF OTHER SPECIFIED CONDITIONS: ICD-10-CM

## 2022-08-15 RX ORDER — AMLODIPINE BESYLATE 5 MG/1
5 TABLET ORAL
Refills: 0 | Status: ACTIVE | COMMUNITY

## 2022-08-15 RX ORDER — FLUTICASONE PROPIONATE 50 UG/1
50 SPRAY, METERED NASAL
Refills: 0 | Status: ACTIVE | COMMUNITY

## 2022-08-16 NOTE — ED ADULT TRIAGE NOTE - CCCP TRG CHIEF CMPLNT
Watch skin for ABCD's and notify if any changes.  Try the inhaler as needed. Let's touch base on that in 4 weeks.  Try the cream for your foot pain.  Will be notified of pending labs.      Patient Education   Personalized Prevention Plan  You are due for the preventive services outlined below.  Your care team is available to assist you in scheduling these services.  If you have already completed any of these items, please share that information with your care team to update in your medical record.  Health Maintenance Due   Topic Date Due    Osteoporosis Screening  12/17/2016    ANNUAL REVIEW OF HM ORDERS  06/02/2022    Basic Metabolic Panel  07/13/2022    FALL RISK ASSESSMENT  07/27/2022    Annual Wellness Visit  07/27/2022          
hypertension

## 2022-08-24 ENCOUNTER — APPOINTMENT (OUTPATIENT)
Dept: PULMONOLOGY | Facility: CLINIC | Age: 87
End: 2022-08-24

## 2022-08-24 VITALS
DIASTOLIC BLOOD PRESSURE: 64 MMHG | SYSTOLIC BLOOD PRESSURE: 114 MMHG | WEIGHT: 124 LBS | BODY MASS INDEX: 25 KG/M2 | HEIGHT: 59 IN | OXYGEN SATURATION: 96 % | HEART RATE: 76 BPM | TEMPERATURE: 99.1 F

## 2022-08-24 DIAGNOSIS — I70.91 GENERALIZED ATHEROSCLEROSIS: ICD-10-CM

## 2022-08-24 DIAGNOSIS — R51.9 HEADACHE, UNSPECIFIED: ICD-10-CM

## 2022-08-24 PROCEDURE — 99213 OFFICE O/P EST LOW 20 MIN: CPT | Mod: 25

## 2022-08-24 PROCEDURE — 36415 COLL VENOUS BLD VENIPUNCTURE: CPT

## 2022-08-24 RX ORDER — AZITHROMYCIN 250 MG/1
250 TABLET, FILM COATED ORAL
Qty: 1 | Refills: 0 | Status: COMPLETED | COMMUNITY
Start: 2021-09-14 | End: 2022-08-24

## 2022-08-24 RX ORDER — AZITHROMYCIN 250 MG/1
250 TABLET, FILM COATED ORAL
Qty: 1 | Refills: 0 | Status: COMPLETED | COMMUNITY
Start: 2021-03-23 | End: 2022-08-24

## 2022-08-24 RX ORDER — BENZONATATE 200 MG/1
200 CAPSULE ORAL
Refills: 0 | Status: COMPLETED | COMMUNITY
End: 2022-08-24

## 2022-08-24 RX ORDER — PREDNISONE 20 MG/1
20 TABLET ORAL
Qty: 10 | Refills: 0 | Status: COMPLETED | COMMUNITY
Start: 2021-10-12 | End: 2022-08-24

## 2022-08-24 RX ORDER — PREDNISONE 20 MG/1
20 TABLET ORAL
Qty: 10 | Refills: 0 | Status: COMPLETED | COMMUNITY
Start: 2021-03-23 | End: 2022-08-24

## 2022-08-24 RX ORDER — ALBUTEROL SULFATE 90 UG/1
108 (90 BASE) INHALANT RESPIRATORY (INHALATION)
Qty: 1 | Refills: 3 | Status: COMPLETED | COMMUNITY
Start: 2022-05-06 | End: 2022-08-24

## 2022-08-24 NOTE — DISCUSSION/SUMMARY
[FreeTextEntry1] : She is a 89-year-old woman with a history of hypertension, hyperlipidemia and hypothyroidism.\par \par Presented with a mild headache since earlier this morning.  No focal neurologic deficits.  Blood pressure controlled.\par  \par The headache may be due to sinus pressure.  Advised to rest at home and take Tylenol.  Use air conditioning (the weather is hot and humid at the current time).  Blood work obtained.  Advised to go to the emergency room if the headache gets any worse.  Can call back tomorrow if any concern.  Follow-up in 1 week.  Follow-up with her primary care provider also advised.

## 2022-08-24 NOTE — PHYSICAL EXAM
[No Acute Distress] : no acute distress [Normal Appearance] : normal appearance [Well Groomed] : well groomed [Normal Oropharynx] : normal oropharynx [No Neck Mass] : no neck mass [Normal Rate/Rhythm] : normal rate/rhythm [Normal S1, S2] : normal s1, s2 [No Murmurs] : no murmurs [No Resp Distress] : no resp distress [Clear to Auscultation Bilaterally] : clear to auscultation bilaterally [No HSM] : no hsm [Normal Gait] : normal gait [No Cyanosis] : no cyanosis [No Edema] : no edema [Normal Turgor] : normal turgor [No Focal Deficits] : no focal deficits [Cranial Nerves Intact] : cranial nerves intact [Normal Reflexes] : normal reflexes [No Motor Deficits] : no motor deficits [Oriented x3] : oriented x3 [Normal Affect] : normal affect [TextBox_132] : Pupils were equal and reactive, EOMs were normal, no facial dissymmetry, uvula was midline, no motor deficits, ambulated without a problem.

## 2022-08-24 NOTE — HISTORY OF PRESENT ILLNESS
[Former] : former [TextBox_4] : Presented with a headache. She spoke to her son in an MD. Advised she see an MD or go to the ER. \par \par Has a headache since this am. Woke up with it. Did not take any medication for it yet.  No visual symptoms.  No nausea vomiting.  No neck pain.  No motor deficit.  No dysphagia or dysphonia.  She has been able to ambulate without a problem.  She walked into the office this morning and requested to be seen.  She did not have an appointment.  [Frequent Nocturnal Awakening] : denies frequent nocturnal awakening

## 2022-08-24 NOTE — REVIEW OF SYSTEMS
[Thyroid Problem] : thyroid problem [Fever] : no fever [Chills] : no chills [Nasal Congestion] : no nasal congestion [Cough] : no cough [Dyspnea] : no dyspnea [Chest Discomfort] : no chest discomfort [Palpitations] : no palpitations [Nasal Discharge] : no nasal discharge [GERD] : no gerd [Anemia] : no anemia [Headache] : no headache [Anxiety] : no anxiety

## 2022-08-25 ENCOUNTER — APPOINTMENT (OUTPATIENT)
Dept: PODIATRY | Facility: CLINIC | Age: 87
End: 2022-08-25

## 2022-08-25 LAB
ALBUMIN SERPL ELPH-MCNC: 4 G/DL
ALP BLD-CCNC: 90 U/L
ALT SERPL-CCNC: 16 U/L
ANION GAP SERPL CALC-SCNC: 13 MMOL/L
AST SERPL-CCNC: 18 U/L
BASOPHILS # BLD AUTO: 0.03 K/UL
BASOPHILS NFR BLD AUTO: 0.4 %
BILIRUB SERPL-MCNC: 0.5 MG/DL
BUN SERPL-MCNC: 22 MG/DL
CALCIUM SERPL-MCNC: 9.4 MG/DL
CHLORIDE SERPL-SCNC: 96 MMOL/L
CHOLEST SERPL-MCNC: 124 MG/DL
CO2 SERPL-SCNC: 24 MMOL/L
CREAT SERPL-MCNC: 0.86 MG/DL
EGFR: 62 ML/MIN/1.73M2
EOSINOPHIL # BLD AUTO: 0.06 K/UL
EOSINOPHIL NFR BLD AUTO: 0.7 %
ERYTHROCYTE [SEDIMENTATION RATE] IN BLOOD BY WESTERGREN METHOD: 55 MM/HR
ESTIMATED AVERAGE GLUCOSE: 120 MG/DL
GLUCOSE SERPL-MCNC: 132 MG/DL
HBA1C MFR BLD HPLC: 5.8 %
HCT VFR BLD CALC: 34.2 %
HDLC SERPL-MCNC: 46 MG/DL
HGB BLD-MCNC: 11.2 G/DL
IMM GRANULOCYTES NFR BLD AUTO: 0.5 %
LDLC SERPL CALC-MCNC: 67 MG/DL
LYMPHOCYTES # BLD AUTO: 0.99 K/UL
LYMPHOCYTES NFR BLD AUTO: 12.2 %
MAN DIFF?: NORMAL
MCHC RBC-ENTMCNC: 31.2 PG
MCHC RBC-ENTMCNC: 32.7 GM/DL
MCV RBC AUTO: 95.3 FL
MONOCYTES # BLD AUTO: 0.88 K/UL
MONOCYTES NFR BLD AUTO: 10.9 %
NEUTROPHILS # BLD AUTO: 6.11 K/UL
NEUTROPHILS NFR BLD AUTO: 75.3 %
NONHDLC SERPL-MCNC: 78 MG/DL
PLATELET # BLD AUTO: 123 K/UL
POTASSIUM SERPL-SCNC: 4.4 MMOL/L
PROT SERPL-MCNC: 6.3 G/DL
RBC # BLD: 3.59 M/UL
RBC # FLD: 13.3 %
SODIUM SERPL-SCNC: 133 MMOL/L
TRIGL SERPL-MCNC: 55 MG/DL
TSH SERPL-ACNC: 0.38 UIU/ML
WBC # FLD AUTO: 8.11 K/UL

## 2022-09-28 ENCOUNTER — NON-APPOINTMENT (OUTPATIENT)
Age: 87
End: 2022-09-28

## 2022-09-28 ENCOUNTER — APPOINTMENT (OUTPATIENT)
Dept: OPHTHALMOLOGY | Facility: CLINIC | Age: 87
End: 2022-09-28

## 2022-09-28 PROCEDURE — 92250 FUNDUS PHOTOGRAPHY W/I&R: CPT

## 2022-09-28 PROCEDURE — 92014 COMPRE OPH EXAM EST PT 1/>: CPT

## 2022-10-04 ENCOUNTER — NON-APPOINTMENT (OUTPATIENT)
Age: 87
End: 2022-10-04

## 2022-10-04 ENCOUNTER — APPOINTMENT (OUTPATIENT)
Dept: OPHTHALMOLOGY | Facility: CLINIC | Age: 87
End: 2022-10-04

## 2022-10-04 PROCEDURE — 99214 OFFICE O/P EST MOD 30 MIN: CPT | Mod: 25

## 2022-10-04 PROCEDURE — 68801 DILATE TEAR DUCT OPENING: CPT | Mod: 50

## 2022-10-06 ENCOUNTER — APPOINTMENT (OUTPATIENT)
Dept: PULMONOLOGY | Facility: CLINIC | Age: 87
End: 2022-10-06

## 2022-10-06 VITALS
SYSTOLIC BLOOD PRESSURE: 114 MMHG | BODY MASS INDEX: 25.25 KG/M2 | DIASTOLIC BLOOD PRESSURE: 58 MMHG | TEMPERATURE: 97.8 F | HEART RATE: 77 BPM | WEIGHT: 125 LBS | OXYGEN SATURATION: 98 %

## 2022-10-06 DIAGNOSIS — J31.0 CHRONIC RHINITIS: ICD-10-CM

## 2022-10-06 PROCEDURE — G0008: CPT

## 2022-10-06 PROCEDURE — 90662 IIV NO PRSV INCREASED AG IM: CPT

## 2022-10-06 PROCEDURE — 99214 OFFICE O/P EST MOD 30 MIN: CPT | Mod: 25

## 2022-10-11 ENCOUNTER — APPOINTMENT (OUTPATIENT)
Dept: PODIATRY | Facility: CLINIC | Age: 87
End: 2022-10-11

## 2022-10-11 PROCEDURE — 99213 OFFICE O/P EST LOW 20 MIN: CPT

## 2022-10-14 NOTE — HISTORY OF PRESENT ILLNESS
[FreeTextEntry1] : Patient presents today with 2nd hammertoe of the right foot, crossover deformity. 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 She is 89 years old.

## 2022-10-14 NOTE — ASSESSMENT
[FreeTextEntry1] : \par Treatment: I gave her tube foam. I want her to use Aquaphor. I discussed soaking with warm water and Epsom salt. She will follow-up in the office for evaluation as needed.

## 2022-10-14 NOTE — PHYSICAL EXAM
[Delayed in the Right Toes] : capillary refills delayed in the right toes [Delayed in the Left Toes] : capillary refills delayed in the left toes [0] : left foot posterior tibialis 0 [1+] : left foot dorsalis pedis 1+ [Vibration Dec.] : diminished vibratory sensation at the level of the toes [Position Sense Dec.] : diminished position sense at the level of the toes [Diminished Throughout Right Foot] : diminished sensation with monofilament testing throughout right foot [Diminished Throughout Left Foot] : diminished sensation with monofilament testing throughout left foot [FreeTextEntry3] : Decreased hair growth. Thin, atrophic skin. Temperature gradient warm to cool. (Q8 class findings) [de-identified] : End-stage large arthritic bunion. Arthritic right 2nd hammertoe with crossover deformity and 2nd PIPJ irritation and inflammation. [FreeTextEntry1] : Right 2nd toe PIPJ keratosis.

## 2022-11-25 PROBLEM — J31.0 RHINITIS: Status: ACTIVE | Noted: 2022-11-25

## 2022-11-25 NOTE — HISTORY OF PRESENT ILLNESS
[TextBox_4] : 89 yo female presents for evaluation of low back pain which has gotten worse recently. She denies dysuria or trauma. She complains of PRN post nasal drip with productive cough. She uses albuterol MDI PRN.

## 2022-11-25 NOTE — DISCUSSION/SUMMARY
[FreeTextEntry1] : 89 yo female with post nasal drip. PRN albuterol MDI use as before. The influenza vaccine was administered.

## 2022-12-05 ENCOUNTER — MED ADMIN CHARGE (OUTPATIENT)
Age: 87
End: 2022-12-05

## 2022-12-14 ENCOUNTER — APPOINTMENT (OUTPATIENT)
Dept: OPHTHALMOLOGY | Facility: CLINIC | Age: 87
End: 2022-12-14

## 2022-12-16 ENCOUNTER — APPOINTMENT (OUTPATIENT)
Dept: PULMONOLOGY | Facility: CLINIC | Age: 87
End: 2022-12-16

## 2022-12-16 VITALS
HEART RATE: 85 BPM | OXYGEN SATURATION: 97 % | TEMPERATURE: 97.7 F | SYSTOLIC BLOOD PRESSURE: 136 MMHG | WEIGHT: 122 LBS | DIASTOLIC BLOOD PRESSURE: 68 MMHG | BODY MASS INDEX: 24.64 KG/M2

## 2022-12-16 DIAGNOSIS — J20.9 ACUTE BRONCHITIS, UNSPECIFIED: ICD-10-CM

## 2022-12-16 PROCEDURE — 99213 OFFICE O/P EST LOW 20 MIN: CPT

## 2022-12-16 RX ORDER — AZITHROMYCIN 250 MG/1
250 TABLET, FILM COATED ORAL
Qty: 1 | Refills: 0 | Status: ACTIVE | COMMUNITY
Start: 2022-12-16 | End: 1900-01-01

## 2022-12-17 PROBLEM — J20.9 ACUTE BRONCHITIS: Status: ACTIVE | Noted: 2021-05-09

## 2022-12-17 NOTE — REVIEW OF SYSTEMS
[Postnasal Drip] : postnasal drip [Cough] : cough [Hemoptysis] : no hemoptysis [Sputum] : sputum [Wheezing] : no wheezing [SOB on Exertion] : no sob on exertion [Back Pain] : back pain [Negative] : Endocrine

## 2022-12-17 NOTE — HISTORY OF PRESENT ILLNESS
[Former] : former [>= 20 pack years] : >= 20 pack years [TextBox_4] : 89 yo female presents complaining of 2 day hx of productive cough without fever , chest pain or hemoptysis. She has used bronchodilators in the past but not recently. [TextBox_29] : Denies snoring, daytime somnolence, apneic episodes, AM headaches [YearQuit] : 1960

## 2022-12-17 NOTE — DISCUSSION/SUMMARY
[FreeTextEntry1] : 89 yo female with URI. Leeanne prescribed. She was also given a two week sample of trelegy 200. Symptomatic treatment also discussed.

## 2022-12-28 ENCOUNTER — APPOINTMENT (OUTPATIENT)
Dept: OPHTHALMOLOGY | Facility: CLINIC | Age: 87
End: 2022-12-28
Payer: MEDICARE

## 2022-12-28 ENCOUNTER — NON-APPOINTMENT (OUTPATIENT)
Age: 87
End: 2022-12-28

## 2022-12-28 PROCEDURE — 92012 INTRM OPH EXAM EST PATIENT: CPT

## 2023-01-03 ENCOUNTER — OUTPATIENT (OUTPATIENT)
Dept: OUTPATIENT SERVICES | Facility: HOSPITAL | Age: 88
LOS: 1 days | End: 2023-01-03
Payer: MEDICARE

## 2023-01-03 ENCOUNTER — APPOINTMENT (OUTPATIENT)
Dept: CT IMAGING | Facility: IMAGING CENTER | Age: 88
End: 2023-01-03
Payer: MEDICARE

## 2023-01-03 DIAGNOSIS — Z90.89 ACQUIRED ABSENCE OF OTHER ORGANS: Chronic | ICD-10-CM

## 2023-01-03 DIAGNOSIS — R91.8 OTHER NONSPECIFIC ABNORMAL FINDING OF LUNG FIELD: ICD-10-CM

## 2023-01-03 DIAGNOSIS — Z96.652 PRESENCE OF LEFT ARTIFICIAL KNEE JOINT: Chronic | ICD-10-CM

## 2023-01-03 DIAGNOSIS — Z90.49 ACQUIRED ABSENCE OF OTHER SPECIFIED PARTS OF DIGESTIVE TRACT: Chronic | ICD-10-CM

## 2023-01-03 DIAGNOSIS — C34.91 MALIGNANT NEOPLASM OF UNSPECIFIED PART OF RIGHT BRONCHUS OR LUNG: ICD-10-CM

## 2023-01-03 DIAGNOSIS — Z98.49 CATARACT EXTRACTION STATUS, UNSPECIFIED EYE: Chronic | ICD-10-CM

## 2023-01-03 PROCEDURE — 71250 CT THORAX DX C-: CPT

## 2023-01-03 PROCEDURE — 71250 CT THORAX DX C-: CPT | Mod: 26,MH

## 2023-01-04 ENCOUNTER — NON-APPOINTMENT (OUTPATIENT)
Age: 88
End: 2023-01-04

## 2023-01-04 ENCOUNTER — APPOINTMENT (OUTPATIENT)
Dept: THORACIC SURGERY | Facility: CLINIC | Age: 88
End: 2023-01-04
Payer: MEDICARE

## 2023-01-04 VITALS
OXYGEN SATURATION: 92 % | DIASTOLIC BLOOD PRESSURE: 72 MMHG | BODY MASS INDEX: 24.15 KG/M2 | HEART RATE: 67 BPM | HEIGHT: 60 IN | WEIGHT: 123 LBS | SYSTOLIC BLOOD PRESSURE: 145 MMHG

## 2023-01-04 PROCEDURE — 99214 OFFICE O/P EST MOD 30 MIN: CPT

## 2023-01-05 NOTE — ASSESSMENT
[FreeTextEntry1] : MATTHEW CROWELL is a 89 year old Female presenting for a follow up visit. She is status post Right video thoracoscopy with wedge resection of right lower lobe nodule on 11/7/2019. Pathology revealed adenocarcinoma, acinar predominant with additional lepidic component, T1aNx. She is a former smoker with a history of HTN, stroke and chronic lung nodules, followed by pulmonology, Dr. Cifuentes.  \par \par \par I have independently reviewed the medical records and imaging at the time of this office consultation.\par CT Chest on 1/3/2023 showed no evidence of recurrence, stable small GGO's -I recommended patient to return to office in 1 year w/ CT Chest w/o contrast.\par \par Recommendations reviewed with patient during this office visit, and all questions answered; Patient instructed on the importance of follow up and verbalizes understanding. \par \par \par I, CLAUDINE Marie, personally performed the evaluation and management (E/M) services for this established patient who presents today with (a) new problem(s)/exacerbation of (an) existing condition(s). That E/M includes conducting the examination, assessing all new/exacerbated conditions, and establishing a new plan of care. Today, my ACP, Amber Herzog NP was here to observe my evaluation and management services for this new problem/exacerbated condition to be followed going forward.\par \par \par

## 2023-01-05 NOTE — CONSULT LETTER
[FreeTextEntry2] : Dr. Terry Cifuentes (Pul)\par Dr. Teto Fry (PCP)\par Dr. Jovita Palma (Cardiologist)  [FreeTextEntry3] : Clive Leger MD, FACS \par , Division of Thoracic Surgery \par [facility] \par Chief, Thoracic Surgery \par [facility] \par Department of Cardiovascular & Thoracic Surgery \par  \par [facility]\par

## 2023-01-05 NOTE — HISTORY OF PRESENT ILLNESS
[FreeTextEntry1] : MATTHEW CROWELL is a 89 year old female presenting for a follow up visit. She is status post Right video thoracoscopy with wedge resection of right lower lobe nodule on 11/7/2019. Pathology revealed adenocarcinoma, acinar predominant with additional lepidic component, T1aNx. She is a former smoker with a history of HTN, stroke and chronic lung nodules, followed by pulmonology, Dr. Cifuentes.\par \par CT scan done on 2/14/2020 revealed patent central airways. Mild emphysema. The patient is status post interval right lower lobe wedge resection with expected postsurgical changes. \par Multiple groundglass nodules within the right apex measuring up to 1 cm are unchanged. A 1.3 cm subsolid right upper lobe nodule is also unchanged.New 0.5 cm right upper lobe nodule. Stable mucoid impaction and mild atelectasis within the right middle lobe and lingula. No pleural effusion. \par \par CT Chest w/o contrast on 2/12/2021 revealed stable mucoid impaction and atelectasis within the RML and lingula. Unchanged 1.3cm subsolid nodule within the posterior RUL (2:33). Unchanged gg nodules within the RUL measuring up to 1.2 cm. No pleural effusion. \par \par CT Chest on 1/17/2022:\par - Right lower lobe staple line is unchanged. \par - Stable 1.5 x 0.8 cm part solid nodule in the right upper lobe on image 133 series 3 \par - few groundglass nodules measuring up to 1.1 cm on image 117.\par - New 3 mm right middle lobe. \par - Stable bronchiectasis in the right middle lobe and lingula with associated mucous plugging and impacted small airways.\par \par CT Chest on 1/3/2023:\par - post-op changes\par - Stable bronchiectasis, mucoid impaction and atelectasis within right middle lobe and lingula\par - Stable 1.5 x 0.8 cm part solid nodule within posterior segment of right upper lobe (series 5 image 31)\par - Multiple other right upper lobe groundglass nodules measuring up to 1.1 cm are unchanged\par - Previously mentioned 0.3 cm right middle lobe nodule is resolved. No new or enlarging nodule\par \par Patient presents today for 1 year follow up. Denies SOB, CP, cough.\par

## 2023-01-17 ENCOUNTER — APPOINTMENT (OUTPATIENT)
Dept: PODIATRY | Facility: CLINIC | Age: 88
End: 2023-01-17

## 2023-01-18 ENCOUNTER — APPOINTMENT (OUTPATIENT)
Dept: OPHTHALMOLOGY | Facility: CLINIC | Age: 88
End: 2023-01-18
Payer: MEDICARE

## 2023-01-18 ENCOUNTER — NON-APPOINTMENT (OUTPATIENT)
Age: 88
End: 2023-01-18

## 2023-01-18 PROCEDURE — 92012 INTRM OPH EXAM EST PATIENT: CPT

## 2023-03-23 ENCOUNTER — APPOINTMENT (OUTPATIENT)
Dept: PULMONOLOGY | Facility: CLINIC | Age: 88
End: 2023-03-23
Payer: MEDICARE

## 2023-03-23 VITALS
BODY MASS INDEX: 23.44 KG/M2 | OXYGEN SATURATION: 98 % | SYSTOLIC BLOOD PRESSURE: 116 MMHG | HEART RATE: 78 BPM | TEMPERATURE: 96.6 F | DIASTOLIC BLOOD PRESSURE: 60 MMHG | WEIGHT: 120 LBS

## 2023-03-23 DIAGNOSIS — R05.9 COUGH, UNSPECIFIED: ICD-10-CM

## 2023-03-23 DIAGNOSIS — J44.9 CHRONIC OBSTRUCTIVE PULMONARY DISEASE, UNSPECIFIED: ICD-10-CM

## 2023-03-23 PROCEDURE — 99213 OFFICE O/P EST LOW 20 MIN: CPT

## 2023-03-23 RX ORDER — ALBUTEROL SULFATE 90 UG/1
108 (90 BASE) INHALANT RESPIRATORY (INHALATION)
Qty: 1 | Refills: 0 | Status: ACTIVE | COMMUNITY
Start: 2023-03-23 | End: 1900-01-01

## 2023-03-24 ENCOUNTER — NON-APPOINTMENT (OUTPATIENT)
Age: 88
End: 2023-03-24

## 2023-03-24 ENCOUNTER — APPOINTMENT (OUTPATIENT)
Dept: OPHTHALMOLOGY | Facility: CLINIC | Age: 88
End: 2023-03-24
Payer: MEDICARE

## 2023-03-24 PROCEDURE — 92012 INTRM OPH EXAM EST PATIENT: CPT | Mod: 25

## 2023-03-24 PROCEDURE — 68840 EXPLORE/IRRIGATE TEAR DUCTS: CPT | Mod: E2

## 2023-04-17 ENCOUNTER — APPOINTMENT (OUTPATIENT)
Dept: PODIATRY | Facility: CLINIC | Age: 88
End: 2023-04-17
Payer: MEDICARE

## 2023-04-17 PROCEDURE — 99212 OFFICE O/P EST SF 10 MIN: CPT

## 2023-04-26 NOTE — HISTORY OF PRESENT ILLNESS
[Sneakers] : christy [FreeTextEntry1] : Patient presents with a left 5th hammertoe.   She has end-stage hammertoes with bunions and crossover deformity.  they are contracted and she has a very painful left 5th toe.  Pain is 5/10.   There is a preulcerative lesion.

## 2023-04-26 NOTE — ASSESSMENT
[FreeTextEntry1] : Impression: Ronaldkimberlyn.  Pain.  \par \par Treatment: The keratotic lesion was enucleated.  I put an aperture pad on it which I want her to leave on for one week.  She is to get extra width shoes.  She is to soak in warm water and Epsom salts.  Use tube foam protectors and open toed orthopedic shoes for the summer.\par Return: As needed.

## 2023-04-26 NOTE — PHYSICAL EXAM
[Delayed in the Right Toes] : capillary refills delayed in the right toes [Delayed in the Left Toes] : capillary refills delayed in the left toes [0] : left foot posterior tibialis 0 [1+] : left foot dorsalis pedis 1+ [Vibration Dec.] : diminished vibratory sensation at the level of the toes [Position Sense Dec.] : diminished position sense at the level of the toes [Diminished Throughout Right Foot] : diminished sensation with monofilament testing throughout right foot [Diminished Throughout Left Foot] : diminished sensation with monofilament testing throughout left foot [FreeTextEntry3] : Temperature gradient: warm to cool.  [de-identified] : End-stage arthritic contracture of hammer.  Crossover deformity with bunions. [FreeTextEntry1] : Q8 - The patient has class findings of Q8 - Two Class B findings.

## 2023-04-28 ENCOUNTER — NON-APPOINTMENT (OUTPATIENT)
Age: 88
End: 2023-04-28

## 2023-04-28 ENCOUNTER — APPOINTMENT (OUTPATIENT)
Dept: OPHTHALMOLOGY | Facility: CLINIC | Age: 88
End: 2023-04-28
Payer: MEDICARE

## 2023-04-28 PROCEDURE — 92012 INTRM OPH EXAM EST PATIENT: CPT

## 2023-06-10 PROBLEM — J44.9 OAD (OBSTRUCTIVE AIRWAY DISEASE): Status: ACTIVE | Noted: 2017-02-22

## 2023-06-10 NOTE — HISTORY OF PRESENT ILLNESS
[>= 20 pack years] : >= 20 pack years [TextBox_4] : 89 yo female with PRN cough, presents complaining of recent increase in cough with minimal sputum production. The patient denies fever, chest pain or hemoptysis. She has not used albuterol recently. [TextBox_29] : Denies snoring, daytime somnolence, apneic episodes, AM headaches [YearQuit] : 1960

## 2023-06-10 NOTE — PHYSICAL EXAM
[No Acute Distress] : no acute distress [Normal Oropharynx] : normal oropharynx [Normal Appearance] : normal appearance [No Neck Mass] : no neck mass [Normal Rate/Rhythm] : normal rate/rhythm [Normal S1, S2] : normal s1, s2 [No Murmurs] : no murmurs [No Resp Distress] : no resp distress [Clear to Auscultation Bilaterally] : clear to auscultation bilaterally [No Abnormalities] : no abnormalities [Benign] : benign [Normal Gait] : normal gait [No Clubbing] : no clubbing [No Cyanosis] : no cyanosis [FROM] : FROM [No Edema] : no edema [Normal Color/ Pigmentation] : normal color/ pigmentation [No Focal Deficits] : no focal deficits [Normal Affect] : normal affect [Oriented x3] : oriented x3

## 2023-06-10 NOTE — DISCUSSION/SUMMARY
[FreeTextEntry1] : 89-year-old female with recent cough.  Physical exam unremarkable.  She is to use albuterol metered-dose inhaler as needed alone for now.  Follow-up with her PMD was recommended.

## 2023-06-20 ENCOUNTER — APPOINTMENT (OUTPATIENT)
Dept: PULMONOLOGY | Facility: CLINIC | Age: 88
End: 2023-06-20
Payer: MEDICARE

## 2023-06-20 VITALS
WEIGHT: 122 LBS | OXYGEN SATURATION: 96 % | SYSTOLIC BLOOD PRESSURE: 112 MMHG | BODY MASS INDEX: 23.83 KG/M2 | DIASTOLIC BLOOD PRESSURE: 68 MMHG | HEART RATE: 76 BPM | TEMPERATURE: 98.7 F

## 2023-06-20 PROCEDURE — 99214 OFFICE O/P EST MOD 30 MIN: CPT

## 2023-06-20 RX ORDER — MONTELUKAST 10 MG/1
10 TABLET, FILM COATED ORAL
Qty: 90 | Refills: 1 | Status: ACTIVE | COMMUNITY
Start: 2023-06-20 | End: 1900-01-01

## 2023-06-21 NOTE — HISTORY OF PRESENT ILLNESS
[Former] : former [>= 20 pack years] : >= 20 pack years [TextBox_4] : 89-year-old female presents for evaluation of productive cough for 3 weeks.  Patient complains of postnasal drip but denies fever, chest pain, or hemoptysis.  She has used albuterol in the past but not recently. [YearQuit] : 1960 [TextBox_29] : Denies snoring, daytime somnolence, apneic episodes, AM headaches

## 2023-07-13 ENCOUNTER — APPOINTMENT (OUTPATIENT)
Dept: PODIATRY | Facility: CLINIC | Age: 88
End: 2023-07-13

## 2023-07-18 ENCOUNTER — APPOINTMENT (OUTPATIENT)
Dept: PODIATRY | Facility: CLINIC | Age: 88
End: 2023-07-18

## 2023-09-06 ENCOUNTER — APPOINTMENT (OUTPATIENT)
Dept: PODIATRY | Facility: CLINIC | Age: 88
End: 2023-09-06
Payer: MEDICARE

## 2023-09-06 DIAGNOSIS — M20.10 HALLUX VALGUS (ACQUIRED), UNSPECIFIED FOOT: ICD-10-CM

## 2023-09-06 PROCEDURE — 99212 OFFICE O/P EST SF 10 MIN: CPT

## 2023-09-08 PROBLEM — M20.10 HALLUX VALGUS: Status: ACTIVE | Noted: 2023-09-07

## 2023-09-08 NOTE — PHYSICAL EXAM
[Delayed in the Right Toes] : capillary refills delayed in the right toes [Delayed in the Left Toes] : capillary refills delayed in the left toes [0] : left foot posterior tibialis 0 [1+] : left foot dorsalis pedis 1+ [Vibration Dec.] : diminished vibratory sensation at the level of the toes [Position Sense Dec.] : diminished position sense at the level of the toes [Diminished Throughout Right Foot] : diminished sensation with monofilament testing throughout right foot [Diminished Throughout Left Foot] : diminished sensation with monofilament testing throughout left foot [FreeTextEntry3] : Decreased hair growth. Thin, atrophic skin. Temperature gradient warm to cool. The patient has a class finding of Q8-Two Class B findings.   [de-identified] : Subluxation 2nd MPJ with malposition and end-stage crossover deformity with bunion and severe arthrosis at the forefoot.  [FreeTextEntry1] : Preulcerative keratotic lesion right 2nd toe.

## 2023-09-08 NOTE — HISTORY OF PRESENT ILLNESS
[Sneakers] : christy [FreeTextEntry1] : Patient presents today with end-stage large arthritic bunions with crossover deformity and dislocation at the 2nd MPJ. Painful arthritic hammertoe with preulcerative keratotic lesion.

## 2023-09-08 NOTE — ASSESSMENT
[FreeTextEntry1] : Impression: Bunion. Arthritis. Hammertoe.  Treatment: I trimmed the keratotic lesions without incident. I gave her tube foam. I want her to soak with warm water and Epsom salt. I discussed the most soft orthopedic shoes. I encouraged her to walk and inspect the foot daily. continue soaking. Follow-up in the office for evaluation as needed.

## 2023-09-12 ENCOUNTER — APPOINTMENT (OUTPATIENT)
Dept: OPHTHALMOLOGY | Facility: CLINIC | Age: 88
End: 2023-09-12
Payer: MEDICARE

## 2023-09-12 ENCOUNTER — NON-APPOINTMENT (OUTPATIENT)
Age: 88
End: 2023-09-12

## 2023-09-12 PROCEDURE — 92014 COMPRE OPH EXAM EST PT 1/>: CPT | Mod: 25

## 2023-09-12 PROCEDURE — 68840 EXPLORE/IRRIGATE TEAR DUCTS: CPT | Mod: E2,E4

## 2023-09-14 ENCOUNTER — APPOINTMENT (OUTPATIENT)
Dept: PULMONOLOGY | Facility: CLINIC | Age: 88
End: 2023-09-14
Payer: MEDICARE

## 2023-09-14 VITALS
BODY MASS INDEX: 23.44 KG/M2 | WEIGHT: 120 LBS | SYSTOLIC BLOOD PRESSURE: 110 MMHG | TEMPERATURE: 97.8 F | DIASTOLIC BLOOD PRESSURE: 52 MMHG | OXYGEN SATURATION: 95 % | HEART RATE: 72 BPM

## 2023-09-14 PROCEDURE — 99214 OFFICE O/P EST MOD 30 MIN: CPT

## 2023-09-19 RX ORDER — MONTELUKAST 10 MG/1
10 TABLET, FILM COATED ORAL
Qty: 90 | Refills: 3 | Status: ACTIVE | COMMUNITY
Start: 2023-09-19 | End: 1900-01-01

## 2023-10-27 ENCOUNTER — APPOINTMENT (OUTPATIENT)
Dept: OPHTHALMOLOGY | Facility: CLINIC | Age: 88
End: 2023-10-27

## 2023-12-06 ENCOUNTER — APPOINTMENT (OUTPATIENT)
Dept: PODIATRY | Facility: CLINIC | Age: 88
End: 2023-12-06
Payer: MEDICARE

## 2023-12-06 DIAGNOSIS — M20.40 OTHER HAMMER TOE(S) (ACQUIRED), UNSPECIFIED FOOT: ICD-10-CM

## 2023-12-06 DIAGNOSIS — M19.90 UNSPECIFIED OSTEOARTHRITIS, UNSPECIFIED SITE: ICD-10-CM

## 2023-12-06 PROCEDURE — 99212 OFFICE O/P EST SF 10 MIN: CPT

## 2023-12-08 PROBLEM — M20.40 HAMMERTOE: Status: ACTIVE | Noted: 2023-04-25

## 2023-12-08 PROBLEM — M19.90 ARTHRITIS: Status: ACTIVE | Noted: 2023-09-07

## 2023-12-18 RX ORDER — AZITHROMYCIN 250 MG/1
250 TABLET, FILM COATED ORAL
Qty: 1 | Refills: 0 | Status: ACTIVE | COMMUNITY
Start: 2023-12-18 | End: 1900-01-01

## 2023-12-19 RX ORDER — AZITHROMYCIN 250 MG/1
250 TABLET, FILM COATED ORAL
Qty: 1 | Refills: 0 | Status: ACTIVE | COMMUNITY
Start: 2023-12-19 | End: 1900-01-01

## 2023-12-21 ENCOUNTER — APPOINTMENT (OUTPATIENT)
Dept: PULMONOLOGY | Facility: CLINIC | Age: 88
End: 2023-12-21
Payer: MEDICARE

## 2023-12-21 VITALS
WEIGHT: 117 LBS | OXYGEN SATURATION: 97 % | TEMPERATURE: 98.4 F | DIASTOLIC BLOOD PRESSURE: 66 MMHG | HEART RATE: 88 BPM | BODY MASS INDEX: 22.85 KG/M2 | SYSTOLIC BLOOD PRESSURE: 120 MMHG

## 2023-12-21 PROCEDURE — 99214 OFFICE O/P EST MOD 30 MIN: CPT

## 2023-12-21 NOTE — REVIEW OF SYSTEMS
[Nasal Congestion] : no nasal congestion [Postnasal Drip] : postnasal drip [Cough] : cough [Hemoptysis] : no hemoptysis [Sputum] : sputum [Wheezing] : no wheezing [SOB on Exertion] : no sob on exertion [Back Pain] : back pain [Negative] : Endocrine

## 2023-12-21 NOTE — HISTORY OF PRESENT ILLNESS
[Former] : former [>= 20 pack years] : >= 20 pack years [TextBox_4] : 89-year-old female with history of mild hyper reactive airways disease and adenocarcinoma of the lung, presents complaining of recent productive cough without fever, chills, chest pain or hemoptysis.  The patient has been using montelukast daily along.   [YearQuit] : 1960 [TextBox_29] : Denies snoring, daytime somnolence, apneic episodes, AM headaches

## 2023-12-21 NOTE — DISCUSSION/SUMMARY
[FreeTextEntry1] : 89-year-old female with history of mild OAD and early stage lung cancer with resection, presents with mild URI complaints.  Z-Denny was prescribed.  Symptomatic treatment was also discussed.  She is to continue montelukast as before.  Treatment adjustment will depend on symptomatic needs.  Patient is scheduled to be followed up by her thoracic surgeon next month at which time a repeat chest CT will be performed.  PFTs will be repeated in the future.  She is to follow-up with her PMD as before.

## 2023-12-27 ENCOUNTER — OUTPATIENT (OUTPATIENT)
Dept: OUTPATIENT SERVICES | Facility: HOSPITAL | Age: 88
LOS: 1 days | End: 2023-12-27
Payer: MEDICARE

## 2023-12-27 ENCOUNTER — APPOINTMENT (OUTPATIENT)
Dept: CT IMAGING | Facility: IMAGING CENTER | Age: 88
End: 2023-12-27
Payer: MEDICARE

## 2023-12-27 DIAGNOSIS — C34.91 MALIGNANT NEOPLASM OF UNSPECIFIED PART OF RIGHT BRONCHUS OR LUNG: ICD-10-CM

## 2023-12-27 DIAGNOSIS — Z96.652 PRESENCE OF LEFT ARTIFICIAL KNEE JOINT: Chronic | ICD-10-CM

## 2023-12-27 DIAGNOSIS — Z98.49 CATARACT EXTRACTION STATUS, UNSPECIFIED EYE: Chronic | ICD-10-CM

## 2023-12-27 DIAGNOSIS — Z90.89 ACQUIRED ABSENCE OF OTHER ORGANS: Chronic | ICD-10-CM

## 2023-12-27 DIAGNOSIS — Z90.49 ACQUIRED ABSENCE OF OTHER SPECIFIED PARTS OF DIGESTIVE TRACT: Chronic | ICD-10-CM

## 2023-12-27 PROCEDURE — 71250 CT THORAX DX C-: CPT | Mod: 26,MH

## 2023-12-27 PROCEDURE — 71250 CT THORAX DX C-: CPT

## 2024-01-03 ENCOUNTER — APPOINTMENT (OUTPATIENT)
Dept: THORACIC SURGERY | Facility: CLINIC | Age: 89
End: 2024-01-03
Payer: MEDICARE

## 2024-01-03 ENCOUNTER — NON-APPOINTMENT (OUTPATIENT)
Age: 89
End: 2024-01-03

## 2024-01-03 VITALS
HEIGHT: 60 IN | HEART RATE: 71 BPM | OXYGEN SATURATION: 95 % | BODY MASS INDEX: 24.15 KG/M2 | DIASTOLIC BLOOD PRESSURE: 72 MMHG | SYSTOLIC BLOOD PRESSURE: 136 MMHG | RESPIRATION RATE: 16 BRPM | WEIGHT: 123 LBS

## 2024-01-03 DIAGNOSIS — R91.8 OTHER NONSPECIFIC ABNORMAL FINDING OF LUNG FIELD: ICD-10-CM

## 2024-01-03 DIAGNOSIS — C34.91 MALIGNANT NEOPLASM OF UNSPECIFIED PART OF RIGHT BRONCHUS OR LUNG: ICD-10-CM

## 2024-01-03 PROCEDURE — 99213 OFFICE O/P EST LOW 20 MIN: CPT

## 2024-01-04 NOTE — CONSULT LETTER
[FreeTextEntry2] : Dr. Terry Cifuentes (Pul)\par  Dr. Teto Fry (PCP)\par  Dr. Jovita Palma (Cardiologist)  [FreeTextEntry3] : Clive Leger MD, FACS \par  , Division of Thoracic Surgery \par  [facility] \par  Chief, Thoracic Surgery \par  [facility] \par  Department of Cardiovascular & Thoracic Surgery \par   \par  [facility] [employer] [facility]\par

## 2024-01-04 NOTE — ASSESSMENT
[FreeTextEntry1] : MATTHEW CROWELL is a 89 year old Female presenting for a follow up visit. She is status post Right video thoracoscopy with wedge resection of right lower lobe nodule on 11/7/2019. Pathology revealed adenocarcinoma, acinar predominant with additional lepidic component, T1aNx. She is a former smoker with a history of HTN, stroke and chronic lung nodules, followed by pulmonology, Dr. Cifuentes.  She presents today for follow up with imaging.  I have independently reviewed the medical records and imaging at the time of this office consultation. CT Chest reviewed with patient, stable scan. I recommend she returns to clinic in 1 year with CT Chest without contrast. She is agreeable.  Recommendations reviewed with patient during this office visit, and all questions answered; Patient instructed on the importance of follow up and verbalizes understanding.    I, CLAUDINE Marie, personally performed the evaluation and management (E/M) services for this established patient. That E/M includes conducting the examination, assessing all new/exacerbated conditions, and establishing a new plan of care. Today, my ACP, Last Moore NP, was here to observe my evaluation and management services for this new problem/exacerbated condition to be followed going forward.

## 2024-01-04 NOTE — HISTORY OF PRESENT ILLNESS
[FreeTextEntry1] : MATTHEW CROWELL is a 89 year old female presenting for a follow up visit. She is status post Right video thoracoscopy with wedge resection of right lower lobe nodule on 11/7/2019. Pathology revealed adenocarcinoma, acinar predominant with additional lepidic component, T1aNx. She is a former smoker with a history of HTN, stroke and chronic lung nodules, followed by pulmonology, Dr. Cifuentes.  CT scan done on 2/14/2020 revealed patent central airways. Mild emphysema. The patient is status post interval right lower lobe wedge resection with expected postsurgical changes.  Multiple groundglass nodules within the right apex measuring up to 1 cm are unchanged. A 1.3 cm subsolid right upper lobe nodule is also unchanged.New 0.5 cm right upper lobe nodule. Stable mucoid impaction and mild atelectasis within the right middle lobe and lingula. No pleural effusion.   CT Chest w/o contrast on 2/12/2021 revealed stable mucoid impaction and atelectasis within the RML and lingula. Unchanged 1.3cm subsolid nodule within the posterior RUL (2:33). Unchanged gg nodules within the RUL measuring up to 1.2 cm. No pleural effusion.   CT Chest on 1/17/2022: - Right lower lobe staple line is unchanged.  - Stable 1.5 x 0.8 cm part solid nodule in the right upper lobe on image 133 series 3  - few groundglass nodules measuring up to 1.1 cm on image 117. - New 3 mm right middle lobe.  - Stable bronchiectasis in the right middle lobe and lingula with associated mucous plugging and impacted small airways.  CT Chest on 1/3/2023: - post-op changes - Stable bronchiectasis, mucoid impaction and atelectasis within right middle lobe and lingula - Stable 1.5 x 0.8 cm part solid nodule within posterior segment of right upper lobe (series 5 image 31) - Multiple other right upper lobe groundglass nodules measuring up to 1.1 cm are unchanged - Previously mentioned 0.3 cm right middle lobe nodule is resolved. No new or enlarging nodule.  CT Chest on 12/27/2023: - Right lower lobe staple line is unchanged.  - Stable 1.5 x 0.8 cm part solid nodule in the right upper lobe on image 133 series 3 and a few groundglass nodules measuring up to 1.1 cm on image 117.  - New 3 mm right middle lobe.  - Stable bronchiectasis in the right middle lobe and lingula with associated mucous plugging and impacted small airways.   Patient presents today for 1 year follow up. Overall, she reports to be feeling well. Denies any chest pain, shortness of breath, cough, or hemoptysis. Reports she had the flu about 3 weeks, with runny nose and cough at the time, now resolved.

## 2024-01-22 NOTE — PATIENT PROFILE ADULT - ...
CC:  Ginette Polk is here today for Office Visit (6 month f/u), Hypothyroidism, Anxiety, and Diabetes     Medications: medications verified and updated    Refills needed today? NO    Patient would like communication of their results via:     Cell Phone:   Telephone Information:   Mobile [phone]     Okay to leave a message containing results? Yes    Tobacco history: verified    Patient's current [facility] status: Active.    Pharmacy Verified? Yes    Health Maintenance Due   Topic Date Due   • DTaP/Tdap/Td Vaccine (1 - Tdap) 08/11/2008   • Pneumococcal Vaccine 0-64 (2 of 2 - PPSV23 or PCV20) 04/27/2015   • COVID-19 Vaccine (4 - 2023-24 season) 09/01/2023   • Shingles Vaccine (2 of 2) 09/14/2023   • DM/CKD GFR  11/17/2023   • Diabetes Foot Exam  01/20/2024   • Diabetes Eye Exam  03/17/2024       Patient is due for topics as listed above but is not proceeding with Immunization(s) COVID-19, Dtap/Tdap/Td, and Pneumococcal at this time.              07-Nov-2019 15:32:04

## 2024-02-27 ENCOUNTER — APPOINTMENT (OUTPATIENT)
Dept: PODIATRY | Facility: CLINIC | Age: 89
End: 2024-02-27
Payer: MEDICARE

## 2024-02-27 DIAGNOSIS — M79.675 PAIN IN LEFT TOE(S): ICD-10-CM

## 2024-02-27 DIAGNOSIS — M19.072 PRIMARY OSTEOARTHRITIS, LEFT ANKLE AND FOOT: ICD-10-CM

## 2024-02-27 DIAGNOSIS — M79.674 PAIN IN RIGHT TOE(S): ICD-10-CM

## 2024-02-27 PROCEDURE — 99212 OFFICE O/P EST SF 10 MIN: CPT

## 2024-02-29 PROBLEM — M79.674 PAIN OF TOE OF RIGHT FOOT: Status: ACTIVE | Noted: 2023-04-25

## 2024-02-29 PROBLEM — M19.072 LOCALIZED, PRIMARY OSTEOARTHRITIS OF THE ANKLE AND FOOT, LEFT: Status: ACTIVE | Noted: 2022-08-15

## 2024-02-29 PROBLEM — M79.675 PAIN OF TOE OF LEFT FOOT: Status: ACTIVE | Noted: 2023-04-25

## 2024-03-01 NOTE — ASSESSMENT
[FreeTextEntry1] : Impression: Arthritis. Pain.  Treatment: I want her to soak with warm water and Epsom salt. I discussed the most appropriate orthopedic type shoes. She should massage with Biofreeze daily. I will see her back in the office for evaluation as needed. I also debrided dystrophic and mycotic nails without incident today.

## 2024-03-01 NOTE — PHYSICAL EXAM
[Delayed in the Right Toes] : capillary refills delayed in the right toes [Delayed in the Left Toes] : capillary refills delayed in the left toes [0] : left foot posterior tibialis 0 [1+] : left foot dorsalis pedis 1+ [Position Sense Dec.] : diminished position sense at the level of the toes [Vibration Dec.] : diminished vibratory sensation at the level of the toes [Diminished Throughout Right Foot] : diminished sensation with monofilament testing throughout right foot [Diminished Throughout Left Foot] : diminished sensation with monofilament testing throughout left foot [FreeTextEntry3] : Decreased hair growth. Thin, atrophic skin. Temperature gradient warm to cool. The patient has a class finding of Q8-Two Class B findings.   [FreeTextEntry1] : She has preulcerative 2nd toe lesion. 2nd and 3rd toe onychomycotic nails. They are yellow, brittle, thick, deformed, elongated and painful. Remainder of nails are dystrophic. [de-identified] : She has severe arthritis at the forefoot, mostly at the 2nd MPJ as a result of dislocation and subluxation bilateral, right side worse than left. She has generalized forefoot arthritis.

## 2024-03-01 NOTE — HISTORY OF PRESENT ILLNESS
[FreeTextEntry1] : Patient presents today because of end-stage deformed arthritic malpositioned foot with crossover deformity and arthrosis and pain at the ball of the foot.

## 2024-03-12 ENCOUNTER — NON-APPOINTMENT (OUTPATIENT)
Age: 89
End: 2024-03-12

## 2024-03-12 ENCOUNTER — APPOINTMENT (OUTPATIENT)
Dept: OPHTHALMOLOGY | Facility: CLINIC | Age: 89
End: 2024-03-12
Payer: MEDICARE

## 2024-03-12 PROCEDURE — 92014 COMPRE OPH EXAM EST PT 1/>: CPT

## 2024-03-13 ENCOUNTER — APPOINTMENT (OUTPATIENT)
Dept: PODIATRY | Facility: CLINIC | Age: 89
End: 2024-03-13

## 2024-05-09 ENCOUNTER — APPOINTMENT (OUTPATIENT)
Dept: PULMONOLOGY | Facility: CLINIC | Age: 89
End: 2024-05-09
Payer: MEDICARE

## 2024-05-09 VITALS
SYSTOLIC BLOOD PRESSURE: 118 MMHG | DIASTOLIC BLOOD PRESSURE: 60 MMHG | HEART RATE: 80 BPM | WEIGHT: 118 LBS | TEMPERATURE: 97.8 F | BODY MASS INDEX: 23.05 KG/M2 | OXYGEN SATURATION: 97 %

## 2024-05-09 PROCEDURE — 99214 OFFICE O/P EST MOD 30 MIN: CPT

## 2024-05-09 RX ORDER — OLMESARTAN MEDOXOMIL 40 MG/1
40 TABLET, FILM COATED ORAL
Refills: 0 | Status: ACTIVE | COMMUNITY

## 2024-05-10 NOTE — HISTORY OF PRESENT ILLNESS
[Former] : former [>= 20 pack years] : >= 20 pack years [TextBox_4] : 89-year-old female with history of hyperreactive airways disease presents for follow-up.  The patient complains of occasional productive cough of clear sputum and postnasal drip without fever, chills, chest pain, hemoptysis, night sweats or weight loss.  She continues to use montelukast daily with rare albuterol use.  [YearQuit] : 1960 [TextBox_29] : Denies snoring, daytime somnolence, apneic episodes, AM headaches

## 2024-05-10 NOTE — DISCUSSION/SUMMARY
[FreeTextEntry1] : 89-year-old female with history of hyperreactive airway disease with related complaints and chronic rhinitis, at baseline.  She is to continue use of montelukast daily with albuterol as needed.  Treatment just will depend on symptomatic needs.  I reviewed the chest CT images online, performed recently as ordered by her thoracic surgeon.  Follow-up imaging as per her surgeon.

## 2024-05-30 ENCOUNTER — APPOINTMENT (OUTPATIENT)
Dept: PODIATRY | Facility: CLINIC | Age: 89
End: 2024-05-30
Payer: MEDICARE

## 2024-05-30 DIAGNOSIS — M20.41 OTHER HAMMER TOE(S) (ACQUIRED), RIGHT FOOT: ICD-10-CM

## 2024-05-30 DIAGNOSIS — M79.676 PAIN IN UNSPECIFIED TOE(S): ICD-10-CM

## 2024-05-30 PROCEDURE — 99212 OFFICE O/P EST SF 10 MIN: CPT

## 2024-05-31 PROBLEM — M20.41 HAMMER TOE OF RIGHT FOOT: Status: ACTIVE | Noted: 2022-10-12

## 2024-05-31 PROBLEM — M79.676 PAIN OF TOE: Status: ACTIVE | Noted: 2023-12-08

## 2024-06-03 NOTE — ASSESSMENT
[FreeTextEntry1] : Impression: Liat. Pain.  Treatment: I prepped the area with alcohol and enucleated the keratotic lesion without incident. I put an aperture pad on. I want her to leave it on for a full week. I discussed using Aquaphor and band-aids to protect the area.  She will call the office with any issues or signs of infection or problem.

## 2024-06-03 NOTE — PHYSICAL EXAM
[Delayed in the Right Toes] : capillary refills delayed in the right toes [Delayed in the Left Toes] : capillary refills delayed in the left toes [0] : left foot posterior tibialis 0 [1+] : left foot dorsalis pedis 1+ [Vibration Dec.] : diminished vibratory sensation at the level of the toes [Position Sense Dec.] : diminished position sense at the level of the toes [Diminished Throughout Right Foot] : diminished sensation with monofilament testing throughout right foot [Diminished Throughout Left Foot] : diminished sensation with monofilament testing throughout left foot [FreeTextEntry3] : Decreased hair growth. Thin, atrophic skin. Temperature gradient warm to cool. The patient has a class finding of Q8-Two Class B findings.   [de-identified] : She has severe arthritis at the forefoot, mostly at the 2nd MPJ as a result of dislocation and subluxation bilateral, right side worse than left. She has generalized forefoot arthritis. [FreeTextEntry1] : Preulcerative lesion 3rd toe right foot PIPJ.

## 2024-06-03 NOTE — HISTORY OF PRESENT ILLNESS
[FreeTextEntry1] : Patient presents today with end-stage deformed arthritic bunion and hammertoes. The toes are crossed over and so bad that the 3rd toe on the right has a preulcerative lesion at the PIPJ.

## 2024-07-16 RX ORDER — MONTELUKAST 10 MG/1
10 TABLET, FILM COATED ORAL
Qty: 90 | Refills: 1 | Status: ACTIVE | COMMUNITY
Start: 2024-07-16 | End: 1900-01-01

## 2024-08-15 ENCOUNTER — APPOINTMENT (OUTPATIENT)
Dept: PODIATRY | Facility: CLINIC | Age: 89
End: 2024-08-15

## 2024-08-27 ENCOUNTER — APPOINTMENT (OUTPATIENT)
Dept: PEDIATRIC ALLERGY IMMUNOLOGY | Facility: CLINIC | Age: 89
End: 2024-08-27

## 2024-09-17 ENCOUNTER — APPOINTMENT (OUTPATIENT)
Dept: PEDIATRIC ALLERGY IMMUNOLOGY | Facility: CLINIC | Age: 89
End: 2024-09-17

## 2024-10-07 ENCOUNTER — NON-APPOINTMENT (OUTPATIENT)
Age: 89
End: 2024-10-07

## 2024-10-07 ENCOUNTER — APPOINTMENT (OUTPATIENT)
Dept: OPHTHALMOLOGY | Facility: CLINIC | Age: 89
End: 2024-10-07
Payer: MEDICARE

## 2024-10-07 PROCEDURE — 92012 INTRM OPH EXAM EST PATIENT: CPT

## 2024-10-09 ENCOUNTER — APPOINTMENT (OUTPATIENT)
Dept: PODIATRY | Facility: CLINIC | Age: 89
End: 2024-10-09

## 2024-10-09 DIAGNOSIS — M20.41 OTHER HAMMER TOE(S) (ACQUIRED), RIGHT FOOT: ICD-10-CM

## 2024-10-09 PROCEDURE — 99212 OFFICE O/P EST SF 10 MIN: CPT

## 2025-01-03 ENCOUNTER — NON-APPOINTMENT (OUTPATIENT)
Age: 89
End: 2025-01-03

## 2025-01-08 ENCOUNTER — NON-APPOINTMENT (OUTPATIENT)
Age: 89
End: 2025-01-08

## 2025-01-14 RX ORDER — AZITHROMYCIN 250 MG/1
250 TABLET, FILM COATED ORAL
Qty: 1 | Refills: 0 | Status: ACTIVE | COMMUNITY
Start: 2025-01-14 | End: 1900-01-01

## 2025-01-16 RX ORDER — AZITHROMYCIN 250 MG/1
250 TABLET, FILM COATED ORAL
Qty: 1 | Refills: 0 | Status: ACTIVE | COMMUNITY
Start: 2025-01-16 | End: 1900-01-01

## 2025-01-17 ENCOUNTER — APPOINTMENT (OUTPATIENT)
Dept: PULMONOLOGY | Facility: CLINIC | Age: 89
End: 2025-01-17
Payer: MEDICARE

## 2025-01-17 VITALS
TEMPERATURE: 97 F | WEIGHT: 125 LBS | SYSTOLIC BLOOD PRESSURE: 138 MMHG | HEART RATE: 77 BPM | OXYGEN SATURATION: 93 % | DIASTOLIC BLOOD PRESSURE: 70 MMHG | BODY MASS INDEX: 24.41 KG/M2

## 2025-01-17 PROCEDURE — 99214 OFFICE O/P EST MOD 30 MIN: CPT

## 2025-01-22 RX ORDER — PREDNISONE 20 MG/1
20 TABLET ORAL
Qty: 10 | Refills: 0 | Status: ACTIVE | COMMUNITY
Start: 2025-01-22 | End: 1900-01-01

## 2025-01-27 ENCOUNTER — APPOINTMENT (OUTPATIENT)
Dept: PODIATRY | Facility: CLINIC | Age: 89
End: 2025-01-27

## 2025-02-07 ENCOUNTER — APPOINTMENT (OUTPATIENT)
Dept: CT IMAGING | Facility: IMAGING CENTER | Age: 89
End: 2025-02-07

## 2025-02-12 ENCOUNTER — APPOINTMENT (OUTPATIENT)
Dept: OPHTHALMOLOGY | Facility: CLINIC | Age: 89
End: 2025-02-12

## 2025-02-27 ENCOUNTER — APPOINTMENT (OUTPATIENT)
Dept: OPHTHALMOLOGY | Facility: CLINIC | Age: 89
End: 2025-02-27

## 2025-07-23 ENCOUNTER — APPOINTMENT (OUTPATIENT)
Dept: THORACIC SURGERY | Facility: CLINIC | Age: 89
End: 2025-07-23